# Patient Record
Sex: FEMALE | Race: BLACK OR AFRICAN AMERICAN | Employment: FULL TIME | URBAN - METROPOLITAN AREA
[De-identification: names, ages, dates, MRNs, and addresses within clinical notes are randomized per-mention and may not be internally consistent; named-entity substitution may affect disease eponyms.]

---

## 2017-05-17 ENCOUNTER — OFFICE VISIT (OUTPATIENT)
Dept: OBGYN CLINIC | Facility: CLINIC | Age: 52
End: 2017-05-17

## 2017-05-17 ENCOUNTER — APPOINTMENT (OUTPATIENT)
Dept: LAB | Facility: REFERENCE LAB | Age: 52
End: 2017-05-17
Attending: OBSTETRICS & GYNECOLOGY
Payer: COMMERCIAL

## 2017-05-17 VITALS
HEIGHT: 66 IN | WEIGHT: 273.19 LBS | SYSTOLIC BLOOD PRESSURE: 130 MMHG | DIASTOLIC BLOOD PRESSURE: 80 MMHG | BODY MASS INDEX: 43.91 KG/M2

## 2017-05-17 DIAGNOSIS — Z12.4 ROUTINE CERVICAL SMEAR: ICD-10-CM

## 2017-05-17 DIAGNOSIS — Z01.419 WOMEN'S ANNUAL ROUTINE GYNECOLOGICAL EXAMINATION: Primary | ICD-10-CM

## 2017-05-17 DIAGNOSIS — Z11.3 SCREENING EXAMINATION FOR VENEREAL DISEASE: ICD-10-CM

## 2017-05-17 DIAGNOSIS — Z30.431 ENCOUNTER FOR ROUTINE CHECKING OF INTRAUTERINE CONTRACEPTIVE DEVICE (IUD): ICD-10-CM

## 2017-05-17 PROCEDURE — 87389 HIV-1 AG W/HIV-1&-2 AB AG IA: CPT

## 2017-05-17 PROCEDURE — 87624 HPV HI-RISK TYP POOLED RSLT: CPT | Performed by: OBSTETRICS & GYNECOLOGY

## 2017-05-17 PROCEDURE — 88175 CYTOPATH C/V AUTO FLUID REDO: CPT | Performed by: OBSTETRICS & GYNECOLOGY

## 2017-05-17 PROCEDURE — 86780 TREPONEMA PALLIDUM: CPT

## 2017-05-17 PROCEDURE — 87491 CHLMYD TRACH DNA AMP PROBE: CPT | Performed by: OBSTETRICS & GYNECOLOGY

## 2017-05-17 PROCEDURE — 36415 COLL VENOUS BLD VENIPUNCTURE: CPT

## 2017-05-17 PROCEDURE — 87591 N.GONORRHOEAE DNA AMP PROB: CPT | Performed by: OBSTETRICS & GYNECOLOGY

## 2017-05-17 PROCEDURE — 87340 HEPATITIS B SURFACE AG IA: CPT

## 2017-05-17 PROCEDURE — 99386 PREV VISIT NEW AGE 40-64: CPT | Performed by: OBSTETRICS & GYNECOLOGY

## 2017-05-17 RX ORDER — ZOLPIDEM TARTRATE 5 MG/1
5 TABLET ORAL
Refills: 0 | COMMUNITY
Start: 2017-03-31 | End: 2018-05-21

## 2017-05-17 RX ORDER — GABAPENTIN 300 MG/1
CAPSULE ORAL
Refills: 5 | COMMUNITY
Start: 2017-04-28 | End: 2018-05-21

## 2017-05-17 RX ORDER — AMLODIPINE BESYLATE 10 MG/1
10 TABLET ORAL
Refills: 0 | COMMUNITY
Start: 2017-04-04 | End: 2018-05-21

## 2017-05-17 RX ORDER — TRAMADOL HYDROCHLORIDE 50 MG/1
TABLET ORAL
Refills: 2 | COMMUNITY
Start: 2017-04-28 | End: 2018-05-21

## 2017-05-17 NOTE — PROGRESS NOTES
GYN H&P     5/17/2017  11:08 AM    CC: Patient is here to establish care    HPI: Patient is a 54year old [id number] here for PAP and annual exam. Has mammogram scheduled. Cycles still regular with Paraguard IUD in place without problems since 2007.  No pelvi Cancer Paternal Aunt    • Colon Cancer Maternal Uncle        Social History   Marital Status: Single  Spouse Name: N/A    Years of Education: N/A  Number of Children: N/A     Occupational History  None on file     Social History Main Topics   Smoking statu intrauterine contraceptive device (IUD)  - Paraguard in good position    3. Screening examination for venereal disease  - TREP; Future  - Rubella, IGG; Future  - HIV AG AB Combo; Future  - *Venipuncture;  Future  - ThinPrep PAP Smear  - Hpv Dna  High Risk ,

## 2018-03-09 ENCOUNTER — TELEPHONE (OUTPATIENT)
Dept: OBGYN CLINIC | Facility: CLINIC | Age: 53
End: 2018-03-09

## 2018-03-09 NOTE — TELEPHONE ENCOUNTER
Lm on voicemail that she needs annual screening mammogram. Last mammogram reported in 2015.  Pt to call back update the chart with recent mammogram or schedule mammogram.

## 2018-05-21 ENCOUNTER — OFFICE VISIT (OUTPATIENT)
Dept: FAMILY MEDICINE CLINIC | Facility: CLINIC | Age: 53
End: 2018-05-21

## 2018-05-21 VITALS
WEIGHT: 293 LBS | HEIGHT: 65.5 IN | SYSTOLIC BLOOD PRESSURE: 126 MMHG | RESPIRATION RATE: 14 BRPM | TEMPERATURE: 98 F | BODY MASS INDEX: 48.23 KG/M2 | DIASTOLIC BLOOD PRESSURE: 84 MMHG | HEART RATE: 89 BPM

## 2018-05-21 DIAGNOSIS — M54.50 CHRONIC BILATERAL LOW BACK PAIN WITHOUT SCIATICA: ICD-10-CM

## 2018-05-21 DIAGNOSIS — G89.29 CHRONIC BILATERAL LOW BACK PAIN WITHOUT SCIATICA: ICD-10-CM

## 2018-05-21 DIAGNOSIS — I10 ESSENTIAL HYPERTENSION: Primary | ICD-10-CM

## 2018-05-21 DIAGNOSIS — G47.00 INSOMNIA, UNSPECIFIED TYPE: ICD-10-CM

## 2018-05-21 PROCEDURE — 99203 OFFICE O/P NEW LOW 30 MIN: CPT | Performed by: FAMILY MEDICINE

## 2018-05-21 PROCEDURE — 99212 OFFICE O/P EST SF 10 MIN: CPT | Performed by: FAMILY MEDICINE

## 2018-05-21 RX ORDER — IBUPROFEN 800 MG/1
800 TABLET ORAL
Qty: 90 TABLET | Refills: 1 | Status: SHIPPED | OUTPATIENT
Start: 2018-05-21 | End: 2019-07-31

## 2018-05-21 RX ORDER — IBUPROFEN 800 MG/1
1 TABLET ORAL
Refills: 5 | COMMUNITY
Start: 2018-04-28 | End: 2018-05-21

## 2018-05-21 RX ORDER — TRAMADOL HYDROCHLORIDE 50 MG/1
TABLET ORAL
Qty: 60 TABLET | Refills: 0 | Status: SHIPPED | OUTPATIENT
Start: 2018-05-21 | End: 2019-07-31

## 2018-05-21 RX ORDER — AMLODIPINE BESYLATE 10 MG/1
10 TABLET ORAL
Qty: 90 TABLET | Refills: 0 | Status: SHIPPED | OUTPATIENT
Start: 2018-05-21 | End: 2018-07-24

## 2018-05-21 RX ORDER — ZOLPIDEM TARTRATE 5 MG/1
5 TABLET ORAL NIGHTLY
Qty: 30 TABLET | Refills: 1 | Status: SHIPPED | OUTPATIENT
Start: 2018-05-21 | End: 2018-08-22

## 2018-05-21 NOTE — PROGRESS NOTES
HPI:    Enma Price is a 50year old female presents to clinic as a new patient to establish care. Has HTN. Notes compliance with meds, denies side effects.  Patient denies HAs, blurry vision, nausea, vomiting, CP, palpitations, dizziness, SOB, or ot tablet (10 mg total) by mouth once daily. Disp: 90 tablet Rfl: 0       Allergies:    Shellfish-Derived P*    ANAPHYLAXIS  Ace Inhibitors          SWELLING    Comment:Swelling of the lips      ROS:   Review of Systems   Constitutional: Negative.     HENT: Ne prescribe her pain medication.     (G47.00) Insomnia, unspecified type  - Ambien refilled. Not to take this more than twice a week.  Patient advised not to drive a vehicle, drink alcohol, operate heavy/sharp machinery, or go to work while taking this medica

## 2018-07-24 RX ORDER — AMLODIPINE BESYLATE 10 MG/1
TABLET ORAL
Qty: 90 TABLET | Refills: 0 | Status: SHIPPED | OUTPATIENT
Start: 2018-07-24 | End: 2019-07-31

## 2018-08-23 RX ORDER — ZOLPIDEM TARTRATE 5 MG/1
TABLET ORAL
Qty: 30 TABLET | Refills: 1 | Status: SHIPPED | OUTPATIENT
Start: 2018-08-23 | End: 2018-12-20

## 2018-08-23 RX ORDER — ZOLPIDEM TARTRATE 5 MG/1
TABLET ORAL
Qty: 30 TABLET | Refills: 1 | OUTPATIENT
Start: 2018-08-23 | End: 2018-08-23

## 2018-08-23 NOTE — TELEPHONE ENCOUNTER
No Protocol on this med.        Pending Prescriptions Disp Refills    ZOLPIDEM TARTRATE 5 MG Oral Tab [Pharmacy Med Name: ZOLPIDEM TARTRATE 5 MG TABLET] 30 tablet 1     Sig: TAKE 1 TABLET BY MOUTH EVERY DAY AT BEDTIME           LOV 5-21-18   [id number] # 30 +

## 2018-09-17 ENCOUNTER — HOSPITAL ENCOUNTER (OUTPATIENT)
Age: 53
Discharge: HOME | End: 2018-09-17
Attending: EMERGENCY MEDICINE
Payer: COMMERCIAL

## 2018-09-17 VITALS
OXYGEN SATURATION: 98 % | DIASTOLIC BLOOD PRESSURE: 79 MMHG | SYSTOLIC BLOOD PRESSURE: 121 MMHG | HEART RATE: 88 BPM | RESPIRATION RATE: 20 BRPM | TEMPERATURE: 98 F

## 2018-09-17 DIAGNOSIS — J02.0 ACUTE STREPTOCOCCAL PHARYNGITIS: Primary | ICD-10-CM

## 2018-09-17 LAB — S PYO AG THROAT QL: POSITIVE

## 2018-09-17 PROCEDURE — 87430 STREP A AG IA: CPT

## 2018-09-17 PROCEDURE — 99213 OFFICE O/P EST LOW 20 MIN: CPT

## 2018-09-17 PROCEDURE — 99204 OFFICE O/P NEW MOD 45 MIN: CPT

## 2018-09-17 RX ORDER — PENICILLIN V POTASSIUM 500 MG/1
500 TABLET ORAL 3 TIMES DAILY
Qty: 30 TABLET | Refills: 0 | Status: SHIPPED | OUTPATIENT
Start: 2018-09-17 | End: 2018-09-27

## 2018-09-17 NOTE — ED INITIAL ASSESSMENT (HPI)
Pt here with complaints of sore throat, cough and congestion that began on Saturday, pt states it hurts to swallow , pt denies any fevers

## 2018-09-17 NOTE — ED PROVIDER NOTES
Patient Seen in: [address]    History   Patient presents with:  Sore Throat    Stated Complaint: sore throat    HPI    The patient's a 51-year-old female with complaints of a sore throat for the last 3 days associated w discharge  Pharynx: Erythema without exudate, tonsillar hypertrophy noted, uvula midline, no drooling trismus or stridor  Neck: Supple without palpable adenopathy  Skin: No rash    ED Course     Labs Reviewed   [facility] POCT RAPID STREP - Abnormal; Notable for

## 2018-09-18 ENCOUNTER — TELEPHONE (OUTPATIENT)
Dept: OBGYN CLINIC | Facility: CLINIC | Age: 53
End: 2018-09-18

## 2018-09-18 DIAGNOSIS — Z12.39 SCREENING BREAST EXAMINATION: Primary | ICD-10-CM

## 2018-09-18 NOTE — TELEPHONE ENCOUNTER
#1 [facility] to schedule annual mammogram   #2 [address] [facility] to schedule annual mammogram, orders done in epic and mailed copy to pt.

## 2018-12-22 RX ORDER — ZOLPIDEM TARTRATE 5 MG/1
TABLET ORAL
Qty: 30 TABLET | Refills: 1 | Status: SHIPPED | OUTPATIENT
Start: 2018-12-22 | End: 2019-07-31

## 2018-12-22 NOTE — TELEPHONE ENCOUNTER
Requested Prescriptions     Pending Prescriptions Disp Refills   • ZOLPIDEM TARTRATE 5 MG Oral Tab [Pharmacy Med Name: ZOLPIDEM TARTRATE 5 MG TABLET] 30 tablet 1     Sig: TAKE 1 TABLET BY MOUTH AT BEDTIME       Last Office Visit with PCP: 5/21/2018    Radha Layne

## 2019-07-31 ENCOUNTER — OFFICE VISIT (OUTPATIENT)
Dept: INTERNAL MEDICINE CLINIC | Facility: CLINIC | Age: 54
End: 2019-07-31
Payer: COMMERCIAL

## 2019-07-31 ENCOUNTER — LAB ENCOUNTER (OUTPATIENT)
Dept: LAB | Age: 54
End: 2019-07-31
Attending: INTERNAL MEDICINE
Payer: COMMERCIAL

## 2019-07-31 VITALS
SYSTOLIC BLOOD PRESSURE: 130 MMHG | DIASTOLIC BLOOD PRESSURE: 82 MMHG | BODY MASS INDEX: 45.92 KG/M2 | HEIGHT: 65.5 IN | TEMPERATURE: 98 F | WEIGHT: 279 LBS | HEART RATE: 89 BPM | OXYGEN SATURATION: 98 %

## 2019-07-31 DIAGNOSIS — I10 BENIGN ESSENTIAL HTN: ICD-10-CM

## 2019-07-31 DIAGNOSIS — M54.50 CHRONIC LOW BACK PAIN, UNSPECIFIED BACK PAIN LATERALITY, UNSPECIFIED WHETHER SCIATICA PRESENT: ICD-10-CM

## 2019-07-31 DIAGNOSIS — Z97.5 IUD (INTRAUTERINE DEVICE) IN PLACE: ICD-10-CM

## 2019-07-31 DIAGNOSIS — Z00.00 PHYSICAL EXAM: Primary | ICD-10-CM

## 2019-07-31 DIAGNOSIS — G89.29 CHRONIC LOW BACK PAIN, UNSPECIFIED BACK PAIN LATERALITY, UNSPECIFIED WHETHER SCIATICA PRESENT: ICD-10-CM

## 2019-07-31 DIAGNOSIS — Z00.00 PHYSICAL EXAM: ICD-10-CM

## 2019-07-31 DIAGNOSIS — N76.1 SUBACUTE VAGINITIS: ICD-10-CM

## 2019-07-31 DIAGNOSIS — E66.01 CLASS 3 SEVERE OBESITY WITH BODY MASS INDEX (BMI) OF 40.0 TO 44.9 IN ADULT, UNSPECIFIED OBESITY TYPE, UNSPECIFIED WHETHER SERIOUS COMORBIDITY PRESENT (HCC): ICD-10-CM

## 2019-07-31 DIAGNOSIS — Z12.9 CANCER SCREENING: ICD-10-CM

## 2019-07-31 LAB
ALBUMIN SERPL-MCNC: 4.1 G/DL (ref 3.4–5)
ALBUMIN/GLOB SERPL: 0.9 {RATIO} (ref 1–2)
ALP LIVER SERPL-CCNC: 73 U/L (ref 41–108)
ALT SERPL-CCNC: 27 U/L (ref 13–56)
ANION GAP SERPL CALC-SCNC: 5 MMOL/L (ref 0–18)
AST SERPL-CCNC: 19 U/L (ref 15–37)
BASOPHILS # BLD AUTO: 0.13 X10(3) UL (ref 0–0.2)
BASOPHILS NFR BLD AUTO: 2.9 %
BILIRUB SERPL-MCNC: 0.2 MG/DL (ref 0.1–2)
BUN BLD-MCNC: 13 MG/DL (ref 7–18)
BUN/CREAT SERPL: 11.7 (ref 10–20)
CALCIUM BLD-MCNC: 9.7 MG/DL (ref 8.5–10.1)
CHLORIDE SERPL-SCNC: 105 MMOL/L (ref 98–112)
CHOLEST SMN-MCNC: 159 MG/DL (ref ?–200)
CO2 SERPL-SCNC: 30 MMOL/L (ref 21–32)
CREAT BLD-MCNC: 1.11 MG/DL (ref 0.55–1.02)
DEPRECATED RDW RBC AUTO: 40.5 FL (ref 35.1–46.3)
EOSINOPHIL # BLD AUTO: 0.11 X10(3) UL (ref 0–0.7)
EOSINOPHIL NFR BLD AUTO: 2.5 %
ERYTHROCYTE [DISTWIDTH] IN BLOOD BY AUTOMATED COUNT: 15.3 % (ref 11–15)
EST. AVERAGE GLUCOSE BLD GHB EST-MCNC: 120 MG/DL (ref 68–126)
GLOBULIN PLAS-MCNC: 4.6 G/DL (ref 2.8–4.4)
GLUCOSE BLD-MCNC: 92 MG/DL (ref 70–99)
HBA1C MFR BLD HPLC: 5.8 % (ref ?–5.7)
HCT VFR BLD AUTO: 35 % (ref 35–48)
HDLC SERPL-MCNC: 71 MG/DL (ref 40–59)
HGB BLD-MCNC: 11.3 G/DL (ref 12–16)
IMM GRANULOCYTES # BLD AUTO: 0.01 X10(3) UL (ref 0–1)
IMM GRANULOCYTES NFR BLD: 0.2 %
LDLC SERPL CALC-MCNC: 75 MG/DL (ref ?–100)
LYMPHOCYTES # BLD AUTO: 1.9 X10(3) UL (ref 1–4)
LYMPHOCYTES NFR BLD AUTO: 42.8 %
M PROTEIN MFR SERPL ELPH: 8.7 G/DL (ref 6.4–8.2)
MCH RBC QN AUTO: 23.6 PG (ref 26–34)
MCHC RBC AUTO-ENTMCNC: 32.3 G/DL (ref 31–37)
MCV RBC AUTO: 73.2 FL (ref 80–100)
MONOCYTES # BLD AUTO: 0.31 X10(3) UL (ref 0.1–1)
MONOCYTES NFR BLD AUTO: 7 %
NEUTROPHILS # BLD AUTO: 1.98 X10 (3) UL (ref 1.5–7.7)
NEUTROPHILS # BLD AUTO: 1.98 X10(3) UL (ref 1.5–7.7)
NEUTROPHILS NFR BLD AUTO: 44.6 %
NONHDLC SERPL-MCNC: 88 MG/DL (ref ?–130)
OSMOLALITY SERPL CALC.SUM OF ELEC: 290 MOSM/KG (ref 275–295)
PATIENT FASTING: NO
PATIENT FASTING: NO
PLATELET # BLD AUTO: 439 10(3)UL (ref 150–450)
POTASSIUM SERPL-SCNC: 4 MMOL/L (ref 3.5–5.1)
RBC # BLD AUTO: 4.78 X10(6)UL (ref 3.8–5.3)
SODIUM SERPL-SCNC: 140 MMOL/L (ref 136–145)
TRIGL SERPL-MCNC: 65 MG/DL (ref 30–149)
TSI SER-ACNC: 1.05 MIU/ML (ref 0.36–3.74)
VLDLC SERPL CALC-MCNC: 13 MG/DL (ref 0–30)
WBC # BLD AUTO: 4.4 X10(3) UL (ref 4–11)

## 2019-07-31 PROCEDURE — 84443 ASSAY THYROID STIM HORMONE: CPT

## 2019-07-31 PROCEDURE — 83036 HEMOGLOBIN GLYCOSYLATED A1C: CPT

## 2019-07-31 PROCEDURE — 87389 HIV-1 AG W/HIV-1&-2 AB AG IA: CPT

## 2019-07-31 PROCEDURE — 85025 COMPLETE CBC W/AUTO DIFF WBC: CPT

## 2019-07-31 PROCEDURE — 87491 CHLMYD TRACH DNA AMP PROBE: CPT

## 2019-07-31 PROCEDURE — 36415 COLL VENOUS BLD VENIPUNCTURE: CPT

## 2019-07-31 PROCEDURE — 80061 LIPID PANEL: CPT

## 2019-07-31 PROCEDURE — 80053 COMPREHEN METABOLIC PANEL: CPT

## 2019-07-31 PROCEDURE — 86780 TREPONEMA PALLIDUM: CPT

## 2019-07-31 PROCEDURE — 99386 PREV VISIT NEW AGE 40-64: CPT | Performed by: INTERNAL MEDICINE

## 2019-07-31 PROCEDURE — 87591 N.GONORRHOEAE DNA AMP PROB: CPT

## 2019-07-31 RX ORDER — ZOLPIDEM TARTRATE 5 MG/1
TABLET ORAL
Qty: 30 TABLET | Refills: 1 | Status: SHIPPED | OUTPATIENT
Start: 2019-07-31 | End: 2019-10-10

## 2019-07-31 RX ORDER — NAPROXEN SODIUM 220 MG
1 TABLET ORAL 2 TIMES DAILY PRN
COMMUNITY

## 2019-07-31 RX ORDER — AMLODIPINE BESYLATE 10 MG/1
10 TABLET ORAL
Qty: 90 TABLET | Refills: 3 | Status: SHIPPED | OUTPATIENT
Start: 2019-07-31 | End: 2020-01-06

## 2019-07-31 RX ORDER — TRAMADOL HYDROCHLORIDE 50 MG/1
TABLET ORAL
Qty: 60 TABLET | Refills: 1 | Status: SHIPPED | OUTPATIENT
Start: 2019-07-31 | End: 2019-10-15

## 2019-07-31 RX ORDER — FLUCONAZOLE 150 MG/1
150 TABLET ORAL ONCE
Qty: 1 TABLET | Refills: 0 | Status: SHIPPED | OUTPATIENT
Start: 2019-07-31 | End: 2019-07-31

## 2019-07-31 NOTE — PROGRESS NOTES
HPI:    Patient ID: Adrian Dumont is a 48year old female. Presents to office for the first time to establish care. Patient has long-standing history of hypertension controlled with current medication.     She also has struggled with weight and has h Tobacco Use      Smoking status: Never Smoker      Smokeless tobacco: Never Used    Substance and Sexual Activity      Alcohol use:  Yes        Alcohol/week: 0.0 standard drinks        Comment: 2-3 drinks/month      Drug use: No      Sexual activity: Never ANAPHYLAXIS  Ace Inhibitors          SWELLING    Comment:Swelling of the lips   PHYSICAL EXAM:   /82 (BP Location: Right arm, Patient Position: Sitting, Cuff Size: adult)   Pulse 89   Temp 98.2 °F (36.8 °C) (Oral)   Ht 5' 5.5\" (1.664 m)   Wt 279 lb diagnosis)  Benign essential htn  Chronic low back pain, unspecified back pain laterality, unspecified whether sciatica present  Class 3 severe obesity with body mass index (bmi) of 40.0 to 44.9 in adult, unspecified obesity type, unspecified whether jamilah available. We will arrange follow-up after lab results reviewed.   Orders Placed This Encounter      Hpv Dna  High Risk , Thin Prep Collect      Comp Metabolic Panel (14) [E]      Lipid Panel [E]      CBC W Differential W Platelet [E]      Hemoglobin A1C

## 2019-08-01 LAB
C TRACH DNA SPEC QL NAA+PROBE: NEGATIVE
HPV I/H RISK 1 DNA SPEC QL NAA+PROBE: NEGATIVE
N GONORRHOEA DNA SPEC QL NAA+PROBE: NEGATIVE

## 2019-08-02 LAB — T PALLIDUM AB SER QL: NEGATIVE

## 2019-08-05 ENCOUNTER — TELEPHONE (OUTPATIENT)
Dept: INTERNAL MEDICINE CLINIC | Facility: CLINIC | Age: 54
End: 2019-08-05

## 2019-08-05 NOTE — TELEPHONE ENCOUNTER
----- Message from Juliet Nicholas MD sent at 8/5/2019  3:36 PM CDT -----  Pap smear is normal with negative HPV. GC and Chlamydia, syphilis test, and HIV all negative.   Cholesterol is 159 with LDL of 75--this is very good and at goal LDL of less than 13

## 2019-10-10 NOTE — TELEPHONE ENCOUNTER
To MD:  The above refill request is for a controlled substance. Please review pended medication order. Print and sign for staff to fax to pharmacy or prescribe electronically.     [place]  - 9/3/2019 #30    Last refilled -  7/31/2019 #30/1

## 2019-10-11 RX ORDER — ZOLPIDEM TARTRATE 5 MG/1
TABLET ORAL
Qty: 30 TABLET | Refills: 1 | Status: SHIPPED | OUTPATIENT
Start: 2019-10-11 | End: 2019-12-30

## 2019-10-15 RX ORDER — TRAMADOL HYDROCHLORIDE 50 MG/1
TABLET ORAL
Qty: 60 TABLET | Refills: 1 | Status: SHIPPED
Start: 2019-10-15 | End: 2019-12-30

## 2019-10-15 NOTE — TELEPHONE ENCOUNTER
Patient calling checking on status of refill request.  Needs Tramadol and Zolpridem Tartrate refilled. She is completely out of both medications.     Best number to contact patient at  [phone]

## 2019-10-15 NOTE — TELEPHONE ENCOUNTER
To Dr. Blaine Nixon---    The above refill request is for a controlled substance. Please review pended medication order. Print and sign for staff to fax to pharmacy.

## 2019-11-05 ENCOUNTER — HOSPITAL ENCOUNTER (OUTPATIENT)
Dept: MAMMOGRAPHY | Age: 54
Discharge: HOME | End: 2019-11-05
Attending: INTERNAL MEDICINE
Payer: COMMERCIAL

## 2019-11-05 DIAGNOSIS — Z12.9 CANCER SCREENING: ICD-10-CM

## 2019-11-05 PROCEDURE — 77063 BREAST TOMOSYNTHESIS BI: CPT | Performed by: INTERNAL MEDICINE

## 2019-11-05 PROCEDURE — 77067 SCR MAMMO BI INCL CAD: CPT | Performed by: INTERNAL MEDICINE

## 2019-11-06 ENCOUNTER — TELEPHONE (OUTPATIENT)
Dept: INTERNAL MEDICINE CLINIC | Facility: CLINIC | Age: 54
End: 2019-11-06

## 2019-11-06 NOTE — TELEPHONE ENCOUNTER
----- Message from Claudine Leventhal, MD sent at 11/6/2019  8:53 AM CST -----  Screening mammogram is normal.  Patient will need follow-up mammogram in 1 year.

## 2019-12-26 NOTE — TELEPHONE ENCOUNTER
To FD to schedule follow up OV (new pt visit 7/2019) then to MD for refills    To DR. GODOY    Tramadol filled 11/16 and 12/6  Zolpidem filled 11/16

## 2019-12-30 RX ORDER — ZOLPIDEM TARTRATE 5 MG/1
TABLET ORAL
Qty: 30 TABLET | Refills: 1 | Status: SHIPPED
Start: 2019-12-30 | End: 2020-01-02

## 2019-12-30 RX ORDER — TRAMADOL HYDROCHLORIDE 50 MG/1
TABLET ORAL
Qty: 60 TABLET | Refills: 1 | Status: SHIPPED | OUTPATIENT
Start: 2019-12-30 | End: 2020-01-02

## 2020-01-02 NOTE — TELEPHONE ENCOUNTER
[facility] pharmacy calling. Patient insurance has changed to [address]. Asking that Tramadol and Zolpidem refills be sent to [place].     [facility]

## 2020-01-02 NOTE — TELEPHONE ENCOUNTER
To Dr Gabriele You, see message below. Meds pended again for you with [facility] pharmacy.

## 2020-01-03 RX ORDER — TRAMADOL HYDROCHLORIDE 50 MG/1
TABLET ORAL
Qty: 60 TABLET | Refills: 1 | Status: SHIPPED | OUTPATIENT
Start: 2020-01-03 | End: 2020-01-06

## 2020-01-03 RX ORDER — ZOLPIDEM TARTRATE 5 MG/1
TABLET ORAL
Qty: 30 TABLET | Refills: 1 | Status: SHIPPED | OUTPATIENT
Start: 2020-01-03 | End: 2020-01-06

## 2020-01-06 ENCOUNTER — OFFICE VISIT (OUTPATIENT)
Dept: INTERNAL MEDICINE CLINIC | Facility: CLINIC | Age: 55
End: 2020-01-06
Payer: COMMERCIAL

## 2020-01-06 ENCOUNTER — TELEPHONE (OUTPATIENT)
Dept: INTERNAL MEDICINE CLINIC | Facility: CLINIC | Age: 55
End: 2020-01-06

## 2020-01-06 VITALS
HEART RATE: 105 BPM | BODY MASS INDEX: 44.67 KG/M2 | WEIGHT: 271.38 LBS | SYSTOLIC BLOOD PRESSURE: 124 MMHG | OXYGEN SATURATION: 100 % | TEMPERATURE: 98 F | DIASTOLIC BLOOD PRESSURE: 78 MMHG | HEIGHT: 65.5 IN

## 2020-01-06 DIAGNOSIS — M19.90 OSTEOARTHRITIS, UNSPECIFIED OSTEOARTHRITIS TYPE, UNSPECIFIED SITE: ICD-10-CM

## 2020-01-06 DIAGNOSIS — E66.01 CLASS 3 SEVERE OBESITY WITH BODY MASS INDEX (BMI) OF 40.0 TO 44.9 IN ADULT, UNSPECIFIED OBESITY TYPE, UNSPECIFIED WHETHER SERIOUS COMORBIDITY PRESENT (HCC): ICD-10-CM

## 2020-01-06 DIAGNOSIS — I10 ESSENTIAL HYPERTENSION: Primary | ICD-10-CM

## 2020-01-06 LAB — INDURATION (): 0 MM (ref 0–11)

## 2020-01-06 PROCEDURE — 90686 IIV4 VACC NO PRSV 0.5 ML IM: CPT | Performed by: INTERNAL MEDICINE

## 2020-01-06 PROCEDURE — 99213 OFFICE O/P EST LOW 20 MIN: CPT | Performed by: INTERNAL MEDICINE

## 2020-01-06 PROCEDURE — 90471 IMMUNIZATION ADMIN: CPT | Performed by: INTERNAL MEDICINE

## 2020-01-06 PROCEDURE — 86580 TB INTRADERMAL TEST: CPT | Performed by: INTERNAL MEDICINE

## 2020-01-06 RX ORDER — ZOLPIDEM TARTRATE 5 MG/1
TABLET ORAL
Qty: 30 TABLET | Refills: 1 | Status: SHIPPED | OUTPATIENT
Start: 2020-01-06 | End: 2020-05-11

## 2020-01-06 RX ORDER — TRAMADOL HYDROCHLORIDE 50 MG/1
TABLET ORAL
Qty: 60 TABLET | Refills: 1 | Status: SHIPPED | OUTPATIENT
Start: 2020-01-06 | End: 2020-06-04

## 2020-01-06 RX ORDER — AMLODIPINE BESYLATE 10 MG/1
10 TABLET ORAL
Qty: 90 TABLET | Refills: 3 | Status: SHIPPED | OUTPATIENT
Start: 2020-01-06 | End: 2021-03-08

## 2020-01-06 NOTE — PROGRESS NOTES
HPI:    Patient ID: Blaire St is a 48year old female. Presents for follow-up. Overall has been feeling well. Has lost 7 pounds since last appointment by trying to follow a proper dietary habits.   Compliant with medications and tolerating all her ASSESSMENT/PLAN:   Essential hypertension  (primary encounter diagnosis)  Osteoarthritis, unspecified osteoarthritis type, unspecified site  Class 3 severe obesity with body mass index (bmi) of 40.0 to 44.9 in adult, unspecified obesity type,

## 2020-01-06 NOTE — TELEPHONE ENCOUNTER
Per Dr. Jozef Hawkins; OK for pt to read TB results 1/8 from home. Clinical Staff to follow up and document appropriately.  Pt has had previous TB Skin Tests in the past for work and is aware skin test reaction should be read between 48 and 72 hours after administr

## 2020-02-05 NOTE — TELEPHONE ENCOUNTER
New order entered for TB test- called patient and relayed message to call office to schedule RN visit - left on VM per hipaa

## 2020-02-05 NOTE — TELEPHONE ENCOUNTER
To Dr. Bertram Garza----    Patient has been attempted to be reached x3 with no call back. Do you want letter mailed to home or continue to attempt to reach patient via phone. If letter: please specify what to tell patient.      If call, do you want RN to place new

## 2020-02-05 NOTE — TELEPHONE ENCOUNTER
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 GODOY

## 2020-03-19 ENCOUNTER — TELEPHONE (OUTPATIENT)
Dept: INTERNAL MEDICINE CLINIC | Facility: CLINIC | Age: 55
End: 2020-03-19

## 2020-03-19 RX ORDER — AMOXICILLIN 500 MG/1
500 CAPSULE ORAL 3 TIMES DAILY
Qty: 30 CAPSULE | Refills: 0 | Status: SHIPPED | OUTPATIENT
Start: 2020-03-19 | End: 2020-03-29

## 2020-03-19 NOTE — TELEPHONE ENCOUNTER
To Dr. Brina Bolanos - see below - sx confirmed as below - sx onset: 2 days ago. Denies fever/chills/cough/difficulty breathing/travel/no contact with any known carrier of flu.

## 2020-03-19 NOTE — TELEPHONE ENCOUNTER
Would empirically treat with amoxicillin 500 mg p.o. 3 times daily x10 days for treatment of pharyngitis. Patient may use warm salt water gargles, Tylenol as needed.   Call if no symptomatic improvement over the next 7 to 10 days, sooner if worse

## 2020-05-11 RX ORDER — ZOLPIDEM TARTRATE 5 MG/1
TABLET ORAL
Qty: 30 TABLET | Refills: 1 | Status: SHIPPED | OUTPATIENT
Start: 2020-05-11 | End: 2020-06-04

## 2020-05-11 NOTE — TELEPHONE ENCOUNTER
To Dr. Emigdio Sung---    The above refill request is for a controlled substance. Please review pended medication order.

## 2020-05-27 RX ORDER — AMLODIPINE BESYLATE 10 MG/1
10 TABLET ORAL
Qty: 90 TABLET | Refills: 3 | OUTPATIENT
Start: 2020-05-27

## 2020-05-28 RX ORDER — TRAMADOL HYDROCHLORIDE 50 MG/1
TABLET ORAL
Qty: 60 TABLET | Refills: 1 | OUTPATIENT
Start: 2020-05-28

## 2020-05-28 RX ORDER — ZOLPIDEM TARTRATE 5 MG/1
TABLET ORAL
Qty: 30 TABLET | Refills: 1 | OUTPATIENT
Start: 2020-05-28

## 2020-05-28 NOTE — TELEPHONE ENCOUNTER
Patient last refilled zolpidem and May 11 and therefore is not due until June 11    It would be okay to fill tramadol

## 2020-06-03 ENCOUNTER — TELEPHONE (OUTPATIENT)
Dept: INTERNAL MEDICINE CLINIC | Facility: CLINIC | Age: 55
End: 2020-06-03

## 2020-06-04 RX ORDER — TRAMADOL HYDROCHLORIDE 50 MG/1
TABLET ORAL
Qty: 60 TABLET | Refills: 1 | Status: SHIPPED | OUTPATIENT
Start: 2020-06-04 | End: 2020-08-24

## 2020-06-04 RX ORDER — ZOLPIDEM TARTRATE 5 MG/1
TABLET ORAL
Qty: 30 TABLET | Refills: 1 | Status: SHIPPED | OUTPATIENT
Start: 2020-06-04 | End: 2020-08-09

## 2020-06-04 NOTE — TELEPHONE ENCOUNTER
To MD:  The above refill request is for a controlled substance. Please review pended medication order. Print and sign for staff to fax to pharmacy or prescribe electronically.       Zolpidem    [place]  - 5/11/2020 #30     Last refilled - 5/11/2020 #30/1

## 2020-08-07 ENCOUNTER — TELEPHONE (OUTPATIENT)
Dept: INTERNAL MEDICINE CLINIC | Facility: CLINIC | Age: 55
End: 2020-08-07

## 2020-08-09 RX ORDER — ZOLPIDEM TARTRATE 5 MG/1
TABLET ORAL
Qty: 30 TABLET | Refills: 1 | Status: SHIPPED | OUTPATIENT
Start: 2020-08-09 | End: 2020-11-04

## 2020-08-24 ENCOUNTER — TELEPHONE (OUTPATIENT)
Dept: INTERNAL MEDICINE CLINIC | Facility: CLINIC | Age: 55
End: 2020-08-24

## 2020-08-24 RX ORDER — TRAMADOL HYDROCHLORIDE 50 MG/1
TABLET ORAL
Qty: 60 TABLET | Refills: 1 | Status: SHIPPED | OUTPATIENT
Start: 2020-08-24 | End: 2020-11-04

## 2020-09-18 RX ORDER — ZOLPIDEM TARTRATE 5 MG/1
TABLET ORAL
Qty: 30 TABLET | Refills: 0 | OUTPATIENT
Start: 2020-09-18

## 2020-09-18 NOTE — TELEPHONE ENCOUNTER
Current refill request refused due to refill is either a duplicate request or has active refills at the pharmacy. Check previous templates.     Requested Prescriptions     Refused Prescriptions Disp Refills   • ZOLPIDEM 5 MG Oral Tab [Pharmacy Med Name: Edson Branch

## 2020-11-04 ENCOUNTER — TELEPHONE (OUTPATIENT)
Dept: INTERNAL MEDICINE CLINIC | Facility: CLINIC | Age: 55
End: 2020-11-04

## 2020-11-04 RX ORDER — ZOLPIDEM TARTRATE 5 MG/1
TABLET ORAL
Qty: 30 TABLET | Refills: 1 | Status: SHIPPED | OUTPATIENT
Start: 2020-11-04 | End: 2021-01-13

## 2020-11-04 RX ORDER — TRAMADOL HYDROCHLORIDE 50 MG/1
TABLET ORAL
Qty: 60 TABLET | Refills: 1 | Status: SHIPPED | OUTPATIENT
Start: 2020-11-04 | End: 2021-01-13

## 2020-11-04 NOTE — TELEPHONE ENCOUNTER
To MD:  The above refill request is for a controlled substance. Please review pended medication order. Print and sign for staff to fax to pharmacy or prescribe electronically.     Tramadol: Last written 8/24/20 #60 with 1. Zolpidem last written 8/9/20 #3

## 2021-01-12 ENCOUNTER — TELEPHONE (OUTPATIENT)
Dept: INTERNAL MEDICINE CLINIC | Facility: CLINIC | Age: 56
End: 2021-01-12

## 2021-01-13 RX ORDER — TRAMADOL HYDROCHLORIDE 50 MG/1
50 TABLET ORAL EVERY 12 HOURS PRN
Qty: 60 TABLET | Refills: 1 | Status: SHIPPED | OUTPATIENT
Start: 2021-01-13 | End: 2021-05-11

## 2021-01-13 RX ORDER — ZOLPIDEM TARTRATE 5 MG/1
TABLET ORAL
Qty: 30 TABLET | Refills: 1 | Status: SHIPPED | OUTPATIENT
Start: 2021-01-13 | End: 2021-03-23

## 2021-01-14 NOTE — TELEPHONE ENCOUNTER
Tito SANDOVAL  Tramadol HCI 50 mg tab  ID [id number], no phone # on form    Placed in purple folder

## 2021-01-15 NOTE — TELEPHONE ENCOUNTER
Prior authorization request form received from [address] [place] [address]  for   Tramadol 50 mg    Placed in purple folder

## 2021-01-15 NOTE — TELEPHONE ENCOUNTER
Pt does not want to go with [facility], her cost with insurance is cheaper  Pt called [facility] and cost is $3 with insurance   Tasked to [employer]

## 2021-01-18 NOTE — TELEPHONE ENCOUNTER
Approval received for     Tramadol HCI tablet 50 mg - Generic    Quantity up to 60 tablets per 30 days    Placed in red folder

## 2021-02-25 NOTE — TELEPHONE ENCOUNTER
Patient last saw  1/6/2020. Needs appointment for annual PE.  To EMA  to please call patient and assist with scheduling then back to Rx group for refill

## 2021-03-08 RX ORDER — AMLODIPINE BESYLATE 10 MG/1
10 TABLET ORAL
Qty: 90 TABLET | Refills: 0 | Status: SHIPPED | OUTPATIENT
Start: 2021-03-08 | End: 2021-05-19

## 2021-03-08 NOTE — TELEPHONE ENCOUNTER
Noted, refill sent. Requested Prescriptions     Signed Prescriptions Disp Refills   • amLODIPine Besylate 10 MG Oral Tab 90 tablet 0     Sig: Take 1 tablet (10 mg total) by mouth once daily.      Authorizing Provider: Margaret Patel     Ordering User:

## 2021-03-08 NOTE — TELEPHONE ENCOUNTER
I attempted to contact the patient. No answer. I left a vmm for her to return my call. Pt scheduled physical w/ Dr Muna Willis on March 17  She is out of medication & needs a refill prior to appt

## 2021-03-23 RX ORDER — ZOLPIDEM TARTRATE 5 MG/1
5 TABLET ORAL NIGHTLY
Qty: 30 TABLET | Refills: 1 | Status: SHIPPED | OUTPATIENT
Start: 2021-03-23 | End: 2021-05-19

## 2021-03-23 NOTE — TELEPHONE ENCOUNTER
To Dr. Ev Acharya - see pended Rx and labs. Pt does have appt with you: 3/24/21. Last labs: 7/31/19    To MD:  The above refill request is for a controlled substance. Please review pended medication order.    Print and sign for staff to fax to pharmacy or pre

## 2021-04-24 ENCOUNTER — TELEPHONE (OUTPATIENT)
Dept: INTERNAL MEDICINE CLINIC | Facility: CLINIC | Age: 56
End: 2021-04-24

## 2021-04-27 RX ORDER — TRAMADOL HYDROCHLORIDE 50 MG/1
50 TABLET ORAL EVERY 12 HOURS PRN
Qty: 30 TABLET | Refills: 0 | OUTPATIENT
Start: 2021-04-27

## 2021-04-27 NOTE — TELEPHONE ENCOUNTER
Patient has not seen me since January 2020--needs physical exam and labs at that time  Therefore cannot refill meds at this time

## 2021-04-27 NOTE — TELEPHONE ENCOUNTER
Noted. Physical scheduled for May. Refill denied. Current refill request refused due to refill is either a duplicate request or has active refills at the pharmacy. Check previous templates.     Requested Prescriptions     Refused Prescriptions Disp Ref

## 2021-04-27 NOTE — TELEPHONE ENCOUNTER
To MD:  The above refill request is for a controlled substance. Please review pended medication order. Print and sign for staff to fax to pharmacy or prescribe electronically.     Last office visit: 1/6/20  Last time refill sent and quantity/refills: 1/1

## 2021-05-11 RX ORDER — TRAMADOL HYDROCHLORIDE 50 MG/1
50 TABLET ORAL EVERY 12 HOURS PRN
Qty: 16 TABLET | Refills: 0 | Status: SHIPPED | OUTPATIENT
Start: 2021-05-11 | End: 2021-05-19

## 2021-05-11 NOTE — TELEPHONE ENCOUNTER
Patient calling checking on status of refill. Asking to have enough until her appointment 5/19/2021. Hoping to get refill and call back today.     Please call patient back [phone]

## 2021-05-11 NOTE — TELEPHONE ENCOUNTER
Patient is calling today to check on the status of the refill below. Patient states she is out of the medication at this time. Patient has an upcoming physical appointment on 5/19/2021.  Patient did have an appointment sooner than this date but was 30 minut

## 2021-05-12 NOTE — TELEPHONE ENCOUNTER
Notified pt via voicemail (ok per verbal release) that refill was sent to pharmacy. Advised to call back with any questions.

## 2021-05-13 RX ORDER — ZOLPIDEM TARTRATE 5 MG/1
5 TABLET ORAL NIGHTLY
Qty: 30 TABLET | Refills: 1 | OUTPATIENT
Start: 2021-05-13

## 2021-05-13 NOTE — TELEPHONE ENCOUNTER
Yeimy/ Tito faxed PA form for Tramadol   Complete and fax to # [phone]  Routed to Rx Placed in [address] [place] [address] folder

## 2021-05-13 NOTE — TELEPHONE ENCOUNTER
Current refill request refused due to refill is either a duplicate request or has active refills at the pharmacy. Check previous templates.     Requested Prescriptions     Refused Prescriptions Disp Refills   • zolpidem 5 MG Oral Tab 30 tablet 1     Sig: T

## 2021-05-19 ENCOUNTER — OFFICE VISIT (OUTPATIENT)
Dept: INTERNAL MEDICINE CLINIC | Facility: CLINIC | Age: 56
End: 2021-05-19
Payer: COMMERCIAL

## 2021-05-19 VITALS
HEART RATE: 91 BPM | OXYGEN SATURATION: 99 % | HEIGHT: 65.5 IN | TEMPERATURE: 99 F | SYSTOLIC BLOOD PRESSURE: 126 MMHG | BODY MASS INDEX: 45.76 KG/M2 | DIASTOLIC BLOOD PRESSURE: 86 MMHG | WEIGHT: 278 LBS

## 2021-05-19 DIAGNOSIS — E66.01 CLASS 3 SEVERE OBESITY WITH BODY MASS INDEX (BMI) OF 40.0 TO 44.9 IN ADULT, UNSPECIFIED OBESITY TYPE, UNSPECIFIED WHETHER SERIOUS COMORBIDITY PRESENT (HCC): ICD-10-CM

## 2021-05-19 DIAGNOSIS — Z97.5 IUD (INTRAUTERINE DEVICE) IN PLACE: ICD-10-CM

## 2021-05-19 DIAGNOSIS — M19.90 OSTEOARTHRITIS, UNSPECIFIED OSTEOARTHRITIS TYPE, UNSPECIFIED SITE: ICD-10-CM

## 2021-05-19 DIAGNOSIS — R92.2 DENSE BREASTS: ICD-10-CM

## 2021-05-19 DIAGNOSIS — Z00.00 PHYSICAL EXAM: Primary | ICD-10-CM

## 2021-05-19 DIAGNOSIS — Z12.31 ENCOUNTER FOR SCREENING MAMMOGRAM FOR MALIGNANT NEOPLASM OF BREAST: ICD-10-CM

## 2021-05-19 DIAGNOSIS — I10 ESSENTIAL HYPERTENSION: ICD-10-CM

## 2021-05-19 PROCEDURE — 3074F SYST BP LT 130 MM HG: CPT | Performed by: INTERNAL MEDICINE

## 2021-05-19 PROCEDURE — 3079F DIAST BP 80-89 MM HG: CPT | Performed by: INTERNAL MEDICINE

## 2021-05-19 PROCEDURE — 99396 PREV VISIT EST AGE 40-64: CPT | Performed by: INTERNAL MEDICINE

## 2021-05-19 PROCEDURE — 3008F BODY MASS INDEX DOCD: CPT | Performed by: INTERNAL MEDICINE

## 2021-05-19 RX ORDER — AMLODIPINE BESYLATE 10 MG/1
10 TABLET ORAL
Qty: 90 TABLET | Refills: 1 | Status: SHIPPED | OUTPATIENT
Start: 2021-05-19 | End: 2021-10-12

## 2021-05-19 RX ORDER — ZOLPIDEM TARTRATE 5 MG/1
5 TABLET ORAL NIGHTLY
Qty: 30 TABLET | Refills: 2 | Status: SHIPPED | OUTPATIENT
Start: 2021-05-19 | End: 2021-08-04

## 2021-05-19 RX ORDER — TRAMADOL HYDROCHLORIDE 50 MG/1
50 TABLET ORAL EVERY 12 HOURS PRN
Qty: 60 TABLET | Refills: 0 | Status: SHIPPED | OUTPATIENT
Start: 2021-05-19 | End: 2021-07-12

## 2021-05-19 NOTE — PROGRESS NOTES
HPI:    Patient ID: Dilip Forrest is a 50year old female. Presents for physical.    Has not been seen by me in over 1 year. Continues to note intermittent low back pain--takes tramadol and this helps. Feels pain gets worse when she gains weight. and voice change. Eyes: Negative for pain and visual disturbance. Respiratory: Negative for cough, chest tightness and shortness of breath. Cardiovascular: Negative for chest pain and palpitations.    Gastrointestinal: Negative for abdominal pain, b Pupils: Pupils are equal, round, and reactive to light. Neck:      Vascular: No carotid bruit. Cardiovascular:      Rate and Rhythm: Normal rate and regular rhythm. Heart sounds: Normal heart sounds.    Pulmonary:      Effort: Pulmonary effort is n pain and occasional bilateral knee pain secondary to osteoarthritis. Again discussed the importance of weight loss to help with this. Patient does use tramadol or Aleve occasionally and this helps discomfort.     Patient is past due for screening colonosc

## 2021-05-19 NOTE — PROGRESS NOTES
HPI:    Patient ID: Herminia Burnett is a 50year old female. Presents for physical exam          Review of Systems   Constitutional: Negative for chills and fever.    HENT: Negative for congestion, hearing loss, postnasal drip, sinus pressure, sore throat (primary encounter diagnosis)  Dense breasts  Physical exam  Essential hypertension  Osteoarthritis, unspecified osteoarthritis type, unspecified site  Class 3 severe obesity with body mass index (bmi) of 40.0 to 44.9 in adult, unspecified obesity type, un

## 2021-05-21 NOTE — TELEPHONE ENCOUNTER
Pt call pt  She is being told a new Prior Authorization is required  Pharmacy told pt that new Prior Authorization is required with each refill   Tasked to [employer]

## 2021-05-21 NOTE — TELEPHONE ENCOUNTER
I spoke with patient's [facility]. Patient's insurance will only cover tramadol for a 28 day supply every 90 days. Explained that a prior authorization was faxed on May 14. Per pharmacist, the cost out of pocket for the prescription would be $49.99.  Left me

## 2021-05-24 ENCOUNTER — TELEPHONE (OUTPATIENT)
Dept: INTERNAL MEDICINE CLINIC | Facility: CLINIC | Age: 56
End: 2021-05-24

## 2021-05-24 NOTE — TELEPHONE ENCOUNTER
Bernardo faxed a PA request for Zolpidem   Ins. # [id number]  Ph.  # [phone]  Routed to Rx Placed in purple folder

## 2021-05-26 NOTE — TELEPHONE ENCOUNTER
Please call pt  for status on refill for Tramadol  Pt said pharmacy has sent request for Prior Authorization  Prior authorization may now be needed every 90 days  Tasked to [employer]

## 2021-05-26 NOTE — TELEPHONE ENCOUNTER
Spoke to [place], [place] for Tramadol is NOT needed (there is an active PA approval already on file x 1 year). RN was advised insurance will only approve dispense of #14 tablets (1 week supply) at a time and no more due to insurance cap/restructions; pt will need to PU weekly from pharmacy. Bernardo advised RN rx is ready for PU at this time. Spoke to pt and advised on the above; she verbalized understanding. Nakia Ching, it does appear PA still needed for Zolpidem if appropriate.

## 2021-07-12 ENCOUNTER — TELEPHONE (OUTPATIENT)
Dept: INTERNAL MEDICINE CLINIC | Facility: CLINIC | Age: 56
End: 2021-07-12

## 2021-07-12 RX ORDER — TRAMADOL HYDROCHLORIDE 50 MG/1
TABLET ORAL
Qty: 60 TABLET | Refills: 0 | Status: SHIPPED | OUTPATIENT
Start: 2021-07-12 | End: 2021-09-07

## 2021-07-12 NOTE — TELEPHONE ENCOUNTER
To MD:  The above refill request is for a controlled substance. Please review pended medication order. Print and sign for staff to fax to pharmacy or prescribe electronically.     Last office visit: 5/19/2021  Last time refill sent and quantity/refills:

## 2021-07-13 ENCOUNTER — HOSPITAL ENCOUNTER (OUTPATIENT)
Dept: MAMMOGRAPHY | Age: 56
Discharge: HOME | End: 2021-07-13
Attending: INTERNAL MEDICINE
Payer: COMMERCIAL

## 2021-07-13 ENCOUNTER — LAB ENCOUNTER (OUTPATIENT)
Dept: LAB | Age: 56
End: 2021-07-13
Attending: INTERNAL MEDICINE
Payer: COMMERCIAL

## 2021-07-30 ENCOUNTER — OFFICE VISIT (OUTPATIENT)
Dept: OBGYN CLINIC | Facility: CLINIC | Age: 56
End: 2021-07-30
Payer: COMMERCIAL

## 2021-07-30 VITALS
WEIGHT: 278 LBS | DIASTOLIC BLOOD PRESSURE: 85 MMHG | BODY MASS INDEX: 45.76 KG/M2 | HEART RATE: 97 BPM | HEIGHT: 65.5 IN | SYSTOLIC BLOOD PRESSURE: 150 MMHG

## 2021-07-30 DIAGNOSIS — Z30.432 ENCOUNTER FOR REMOVAL OF INTRAUTERINE CONTRACEPTIVE DEVICE: Primary | ICD-10-CM

## 2021-07-30 PROCEDURE — 3008F BODY MASS INDEX DOCD: CPT | Performed by: ADVANCED PRACTICE MIDWIFE

## 2021-07-30 PROCEDURE — 3077F SYST BP >= 140 MM HG: CPT | Performed by: ADVANCED PRACTICE MIDWIFE

## 2021-07-30 PROCEDURE — 58301 REMOVE INTRAUTERINE DEVICE: CPT | Performed by: ADVANCED PRACTICE MIDWIFE

## 2021-07-30 PROCEDURE — 3079F DIAST BP 80-89 MM HG: CPT | Performed by: ADVANCED PRACTICE MIDWIFE

## 2021-07-30 NOTE — PROCEDURES
IUD Removal     Here for IUD removal. Has had in for over 10 yrs. Menopausal for 2 yrs. Prior to that regular periods. Last month had right lower quadrant pain, mild. Felt like when first had IUD put in, so thought better have it removed.  Pain now resolved

## 2021-08-03 ENCOUNTER — TELEPHONE (OUTPATIENT)
Dept: INTERNAL MEDICINE CLINIC | Facility: CLINIC | Age: 56
End: 2021-08-03

## 2021-08-04 RX ORDER — ZOLPIDEM TARTRATE 5 MG/1
TABLET ORAL
Qty: 30 TABLET | Refills: 2 | Status: SHIPPED | OUTPATIENT
Start: 2021-08-04 | End: 2021-11-07

## 2021-08-04 NOTE — TELEPHONE ENCOUNTER
To MD:  The above refill request is for a controlled substance. Please review pended medication order. Print and sign for staff to fax to pharmacy or prescribe electronically.     Last office visit: 5/19/21  Last time refill sent and quantity/refills: 5/

## 2021-08-17 ENCOUNTER — HOSPITAL ENCOUNTER (OUTPATIENT)
Dept: MAMMOGRAPHY | Age: 56
Discharge: HOME | End: 2021-08-17
Attending: INTERNAL MEDICINE
Payer: COMMERCIAL

## 2021-08-17 DIAGNOSIS — Z12.31 ENCOUNTER FOR SCREENING MAMMOGRAM FOR MALIGNANT NEOPLASM OF BREAST: ICD-10-CM

## 2021-08-17 DIAGNOSIS — R92.2 DENSE BREASTS: ICD-10-CM

## 2021-08-17 PROCEDURE — 77063 BREAST TOMOSYNTHESIS BI: CPT | Performed by: INTERNAL MEDICINE

## 2021-08-17 PROCEDURE — 77067 SCR MAMMO BI INCL CAD: CPT | Performed by: INTERNAL MEDICINE

## 2021-09-04 ENCOUNTER — TELEPHONE (OUTPATIENT)
Dept: INTERNAL MEDICINE CLINIC | Facility: CLINIC | Age: 56
End: 2021-09-04

## 2021-09-07 RX ORDER — TRAMADOL HYDROCHLORIDE 50 MG/1
TABLET ORAL
Qty: 60 TABLET | Refills: 0 | Status: SHIPPED | OUTPATIENT
Start: 2021-09-07 | End: 2021-12-06

## 2021-09-08 NOTE — TELEPHONE ENCOUNTER
Pt called and states that [address] in [facility] told her that they need a prior authorization to fill the Tramadol HCL

## 2021-09-09 NOTE — TELEPHONE ENCOUNTER
Received a prior authorization via fax from [place] for traMADol HCI Tablets. Patient's ID #[id number]  Placed in purple folder.

## 2021-09-10 NOTE — TELEPHONE ENCOUNTER
PA faxed to [facility]. Confirmation received. PA placed in red folder.

## 2021-09-10 NOTE — TELEPHONE ENCOUNTER
PA for Tramadol received from [email or web address] fax.  [phone]  Ins. [id number]

## 2021-10-12 ENCOUNTER — LAB ENCOUNTER (OUTPATIENT)
Dept: LAB | Age: 56
End: 2021-10-12
Attending: INTERNAL MEDICINE
Payer: COMMERCIAL

## 2021-10-12 ENCOUNTER — OFFICE VISIT (OUTPATIENT)
Dept: INTERNAL MEDICINE CLINIC | Facility: CLINIC | Age: 56
End: 2021-10-12
Payer: COMMERCIAL

## 2021-10-12 VITALS
BODY MASS INDEX: 46.45 KG/M2 | HEIGHT: 65.5 IN | WEIGHT: 282.19 LBS | SYSTOLIC BLOOD PRESSURE: 128 MMHG | OXYGEN SATURATION: 100 % | DIASTOLIC BLOOD PRESSURE: 76 MMHG | TEMPERATURE: 98 F | HEART RATE: 100 BPM

## 2021-10-12 DIAGNOSIS — I10 ESSENTIAL HYPERTENSION: ICD-10-CM

## 2021-10-12 DIAGNOSIS — R10.9 ABDOMINAL PAIN, UNSPECIFIED ABDOMINAL LOCATION: Primary | ICD-10-CM

## 2021-10-12 DIAGNOSIS — Z00.00 PHYSICAL EXAM: ICD-10-CM

## 2021-10-12 DIAGNOSIS — E66.01 CLASS 3 SEVERE OBESITY WITH BODY MASS INDEX (BMI) OF 40.0 TO 44.9 IN ADULT, UNSPECIFIED OBESITY TYPE, UNSPECIFIED WHETHER SERIOUS COMORBIDITY PRESENT (HCC): ICD-10-CM

## 2021-10-12 DIAGNOSIS — M25.512 LEFT SHOULDER PAIN, UNSPECIFIED CHRONICITY: ICD-10-CM

## 2021-10-12 DIAGNOSIS — R10.9 ABDOMINAL PAIN, UNSPECIFIED ABDOMINAL LOCATION: ICD-10-CM

## 2021-10-12 PROCEDURE — 84443 ASSAY THYROID STIM HORMONE: CPT

## 2021-10-12 PROCEDURE — 80061 LIPID PANEL: CPT

## 2021-10-12 PROCEDURE — 36415 COLL VENOUS BLD VENIPUNCTURE: CPT

## 2021-10-12 PROCEDURE — 83036 HEMOGLOBIN GLYCOSYLATED A1C: CPT

## 2021-10-12 PROCEDURE — 99213 OFFICE O/P EST LOW 20 MIN: CPT | Performed by: INTERNAL MEDICINE

## 2021-10-12 PROCEDURE — 3074F SYST BP LT 130 MM HG: CPT | Performed by: INTERNAL MEDICINE

## 2021-10-12 PROCEDURE — 85025 COMPLETE CBC W/AUTO DIFF WBC: CPT

## 2021-10-12 PROCEDURE — 3008F BODY MASS INDEX DOCD: CPT | Performed by: INTERNAL MEDICINE

## 2021-10-12 PROCEDURE — 80053 COMPREHEN METABOLIC PANEL: CPT

## 2021-10-12 PROCEDURE — 3078F DIAST BP <80 MM HG: CPT | Performed by: INTERNAL MEDICINE

## 2021-10-12 PROCEDURE — 82607 VITAMIN B-12: CPT

## 2021-10-12 RX ORDER — OMEPRAZOLE 40 MG/1
40 CAPSULE, DELAYED RELEASE ORAL DAILY
Qty: 30 CAPSULE | Refills: 6 | Status: SHIPPED | OUTPATIENT
Start: 2021-10-12

## 2021-10-12 RX ORDER — AMLODIPINE BESYLATE 10 MG/1
10 TABLET ORAL
Qty: 90 TABLET | Refills: 3 | Status: SHIPPED | OUTPATIENT
Start: 2021-10-12

## 2021-10-12 NOTE — PROGRESS NOTES
HPI:    Patient ID: Yasmany Lawson is a 50year old female. Presents with c/o abd bloating and reflux symptons. Takes tums with improvement    Also notes left shoulder pain. Denies any precipitating known injury.   Discomfort is especially bothersome w Cardiovascular:      Rate and Rhythm: Normal rate and regular rhythm. Heart sounds: Normal heart sounds. Pulmonary:      Effort: Pulmonary effort is normal. No respiratory distress. Breath sounds: Normal breath sounds. No wheezing or rales. make reflux symptoms worse. We discussed referral to bariatric clinic for further management and patient is agreeable. She is noting some left shoulder pain which may be secondary to rotator cuff tendinitis. We will get physical therapy evaluation.

## 2021-10-15 ENCOUNTER — TELEPHONE (OUTPATIENT)
Dept: GASTROENTEROLOGY | Facility: CLINIC | Age: 56
End: 2021-10-15

## 2021-10-15 ENCOUNTER — OFFICE VISIT (OUTPATIENT)
Dept: GASTROENTEROLOGY | Facility: CLINIC | Age: 56
End: 2021-10-15
Payer: COMMERCIAL

## 2021-10-15 VITALS
HEIGHT: 65.5 IN | DIASTOLIC BLOOD PRESSURE: 75 MMHG | SYSTOLIC BLOOD PRESSURE: 100 MMHG | HEART RATE: 92 BPM | WEIGHT: 274.38 LBS | BODY MASS INDEX: 45.17 KG/M2

## 2021-10-15 DIAGNOSIS — K21.9 GASTROESOPHAGEAL REFLUX DISEASE, UNSPECIFIED WHETHER ESOPHAGITIS PRESENT: ICD-10-CM

## 2021-10-15 DIAGNOSIS — R10.13 DYSPEPSIA: ICD-10-CM

## 2021-10-15 DIAGNOSIS — E66.01 CLASS 3 SEVERE OBESITY WITH BODY MASS INDEX (BMI) OF 40.0 TO 44.9 IN ADULT, UNSPECIFIED OBESITY TYPE, UNSPECIFIED WHETHER SERIOUS COMORBIDITY PRESENT (HCC): Primary | ICD-10-CM

## 2021-10-15 DIAGNOSIS — K59.04 CHRONIC IDIOPATHIC CONSTIPATION: ICD-10-CM

## 2021-10-15 DIAGNOSIS — R13.10 DYSPHAGIA, UNSPECIFIED TYPE: ICD-10-CM

## 2021-10-15 DIAGNOSIS — Z12.11 COLON CANCER SCREENING: Primary | ICD-10-CM

## 2021-10-15 PROCEDURE — 99244 OFF/OP CNSLTJ NEW/EST MOD 40: CPT | Performed by: INTERNAL MEDICINE

## 2021-10-15 PROCEDURE — 3078F DIAST BP <80 MM HG: CPT | Performed by: INTERNAL MEDICINE

## 2021-10-15 PROCEDURE — 3074F SYST BP LT 130 MM HG: CPT | Performed by: INTERNAL MEDICINE

## 2021-10-15 PROCEDURE — 3008F BODY MASS INDEX DOCD: CPT | Performed by: INTERNAL MEDICINE

## 2021-10-15 RX ORDER — POLYETHYLENE GLYCOL 3350, SODIUM SULFATE ANHYDROUS, SODIUM BICARBONATE, SODIUM CHLORIDE, POTASSIUM CHLORIDE 236; 22.74; 6.74; 5.86; 2.97 G/4L; G/4L; G/4L; G/4L; G/4L
4 POWDER, FOR SOLUTION ORAL ONCE
Qty: 1 EACH | Refills: 0 | Status: SHIPPED | OUTPATIENT
Start: 2021-10-15 | End: 2021-10-15

## 2021-10-15 NOTE — TELEPHONE ENCOUNTER
Scheduled for:  Colonoscopy [address] ,[place] [address]   Provider Name:  Dr. Carmina Wilder  Date:  2/1/2022  Location:  [facility]   Sedation:  Mac   Time:  7530 Am (pt is aware to arrive at 0630 Am )   Prep: Golytely /Egd   Prep instructions were given to pt in the office, pt ve

## 2021-10-15 NOTE — PROGRESS NOTES
HPI:    Patient ID: Levi Camara is a 50year old woman with elevated BMI 45, history hypertension and elevated blood glucose levels who is referred by Dr. Rafy Bernard to discuss the following issues:    1.  GERD symptoms  · Recent, uncertain duration o (126.1 kg)  01/06/20 : 271 lb 6 oz (123.1 kg)  07/31/19 : 279 lb (126.6 kg)  05/21/18 : 297 lb (134.7 kg)  05/17/17 : 273 lb 3.2 oz (123.9 kg)        Previous EGD examination: n  Previous colonoscopy(ies): n    HPI    Review of Systems         Current Outp discontinuing all milk and yogurt  · Daily bowel regimen for the constipation as below  · Just starting omeprazole this week as above. · Takes Gas-X medication several times per week  · Consideration for FODMAP diet.   FODMAP diet handout issued and discus milk    B.   Daily dose of fiber - helps some people and makes some people worse (diarrhea or bloating)  -  Metamucil 1-2 heaping tablespoons in water or juice once or twice daily , also comes as pills which aren't as effective - take 4-6 pills per day  -

## 2021-10-15 NOTE — PATIENT INSTRUCTIONS
1. Daily laxative regimen to make you more regular. Goal is 1-2 easy bowel movements per day. Need to take every day or several times per week. Options:    A.   Mildest: Stool softeners - Colace/docusate - 2-6 pills per day (all at once or 1-2 twice per

## 2021-11-06 ENCOUNTER — TELEPHONE (OUTPATIENT)
Dept: INTERNAL MEDICINE CLINIC | Facility: CLINIC | Age: 56
End: 2021-11-06

## 2021-11-07 RX ORDER — ZOLPIDEM TARTRATE 5 MG/1
TABLET ORAL
Qty: 30 TABLET | Refills: 2 | Status: SHIPPED | OUTPATIENT
Start: 2021-11-07 | End: 2022-02-02

## 2021-11-08 NOTE — TELEPHONE ENCOUNTER
To MD:  The above refill request is for a controlled substance. Please review pended medication order. Print and sign for staff to fax to pharmacy or prescribe electronically.     Last office visit: 10/12/21  Last time refill sent and quantity/refills: 8

## 2021-12-06 RX ORDER — TRAMADOL HYDROCHLORIDE 50 MG/1
TABLET ORAL
Qty: 60 TABLET | Refills: 0 | Status: SHIPPED | OUTPATIENT
Start: 2021-12-06 | End: 2022-02-02

## 2021-12-06 NOTE — TELEPHONE ENCOUNTER
To Dr. Chestine Lanes----    The above refill request is for a controlled substance. Please review pended medication order.       Lov: 10/12/21  Prescribed: 9/7/21 #60   : 9/14/21 #60

## 2021-12-07 ENCOUNTER — IMMUNIZATION (OUTPATIENT)
Dept: LAB | Facility: HOSPITAL | Age: 56
End: 2021-12-07
Attending: EMERGENCY MEDICINE
Payer: COMMERCIAL

## 2021-12-07 DIAGNOSIS — Z23 NEED FOR VACCINATION: Primary | ICD-10-CM

## 2021-12-07 PROCEDURE — 0004A SARSCOV2 VAC 30MCG/0.3ML IM: CPT

## 2022-01-27 NOTE — PAT NURSING NOTE
This RN spoke with patient to go over pre procedure information for appointment on 2/1/22. Patient was informed that she cannot take an uber home and will need to have someone available to drive her home. She was instructed to call PAT office back if  wants to come into the hospital so that we can add them as her care partner. RN also supplied patient with Md office number for any questions on prep instructions. She was unsure if she had them still or not.

## 2022-01-28 ENCOUNTER — TELEPHONE (OUTPATIENT)
Dept: GASTROENTEROLOGY | Facility: CLINIC | Age: 57
End: 2022-01-28

## 2022-01-28 NOTE — TELEPHONE ENCOUNTER
Patient contacted. I helped her access her MyChart over the phone and she was able to view the instructions that I sent today. All questions answered. Reviewed how to drink the split dose prep.

## 2022-01-28 NOTE — TELEPHONE ENCOUNTER
Pt has procedure Tuesday and needs instructions for prep and diet and needs to know where she is going and what time.  Please call

## 2022-01-29 ENCOUNTER — LAB ENCOUNTER (OUTPATIENT)
Dept: LAB | Facility: HOSPITAL | Age: 57
End: 2022-01-29
Attending: INTERNAL MEDICINE
Payer: COMMERCIAL

## 2022-01-29 DIAGNOSIS — Z01.818 PRE-OP TESTING: ICD-10-CM

## 2022-01-30 LAB — SARS-COV-2 RNA RESP QL NAA+PROBE: NOT DETECTED

## 2022-01-31 ENCOUNTER — TELEPHONE (OUTPATIENT)
Dept: GASTROENTEROLOGY | Facility: CLINIC | Age: 57
End: 2022-01-31

## 2022-01-31 NOTE — TELEPHONE ENCOUNTER
Spoke to Endo & pt, unable to move pt to a later time on 2/1/22, Dr. Mari Blue schedule is full & Endo will be moving pt's up (per Elena). Went over prep instructions with pt & answered prep questions that she had. Pt verbally understood.     TE neisha

## 2022-02-01 ENCOUNTER — ANESTHESIA (OUTPATIENT)
Dept: ENDOSCOPY | Facility: HOSPITAL | Age: 57
End: 2022-02-01
Payer: COMMERCIAL

## 2022-02-01 ENCOUNTER — HOSPITAL ENCOUNTER (OUTPATIENT)
Facility: HOSPITAL | Age: 57
Setting detail: HOSPITAL OUTPATIENT SURGERY
Discharge: HOME | End: 2022-02-01
Attending: INTERNAL MEDICINE | Admitting: INTERNAL MEDICINE
Payer: COMMERCIAL

## 2022-02-01 ENCOUNTER — ANESTHESIA EVENT (OUTPATIENT)
Dept: ENDOSCOPY | Facility: HOSPITAL | Age: 57
End: 2022-02-01
Payer: COMMERCIAL

## 2022-02-01 ENCOUNTER — TELEPHONE (OUTPATIENT)
Dept: INTERNAL MEDICINE CLINIC | Facility: CLINIC | Age: 57
End: 2022-02-01

## 2022-02-01 VITALS
WEIGHT: 279 LBS | TEMPERATURE: 98 F | HEART RATE: 80 BPM | BODY MASS INDEX: 44.84 KG/M2 | RESPIRATION RATE: 21 BRPM | OXYGEN SATURATION: 96 % | DIASTOLIC BLOOD PRESSURE: 85 MMHG | SYSTOLIC BLOOD PRESSURE: 136 MMHG | HEIGHT: 66 IN

## 2022-02-01 DIAGNOSIS — Z01.818 PRE-OP TESTING: Primary | ICD-10-CM

## 2022-02-01 DIAGNOSIS — R13.10 DYSPHAGIA, UNSPECIFIED TYPE: ICD-10-CM

## 2022-02-01 DIAGNOSIS — R10.13 DYSPEPSIA: ICD-10-CM

## 2022-02-01 DIAGNOSIS — Z12.11 COLON CANCER SCREENING: ICD-10-CM

## 2022-02-01 DIAGNOSIS — K21.9 GASTROESOPHAGEAL REFLUX DISEASE, UNSPECIFIED WHETHER ESOPHAGITIS PRESENT: ICD-10-CM

## 2022-02-01 DIAGNOSIS — K59.04 CHRONIC IDIOPATHIC CONSTIPATION: ICD-10-CM

## 2022-02-01 PROCEDURE — 0DJD8ZZ INSPECTION OF LOWER INTESTINAL TRACT, VIA NATURAL OR ARTIFICIAL OPENING ENDOSCOPIC: ICD-10-PCS | Performed by: INTERNAL MEDICINE

## 2022-02-01 PROCEDURE — 45378 DIAGNOSTIC COLONOSCOPY: CPT | Performed by: INTERNAL MEDICINE

## 2022-02-01 PROCEDURE — 43235 EGD DIAGNOSTIC BRUSH WASH: CPT | Performed by: INTERNAL MEDICINE

## 2022-02-01 PROCEDURE — 0DJ08ZZ INSPECTION OF UPPER INTESTINAL TRACT, VIA NATURAL OR ARTIFICIAL OPENING ENDOSCOPIC: ICD-10-PCS | Performed by: INTERNAL MEDICINE

## 2022-02-01 RX ORDER — SODIUM CHLORIDE, SODIUM LACTATE, POTASSIUM CHLORIDE, CALCIUM CHLORIDE 600; 310; 30; 20 MG/100ML; MG/100ML; MG/100ML; MG/100ML
INJECTION, SOLUTION INTRAVENOUS CONTINUOUS
Status: DISCONTINUED | OUTPATIENT
Start: 2022-02-01 | End: 2022-02-01

## 2022-02-01 RX ORDER — LIDOCAINE HYDROCHLORIDE 10 MG/ML
INJECTION, SOLUTION EPIDURAL; INFILTRATION; INTRACAUDAL; PERINEURAL AS NEEDED
Status: DISCONTINUED | OUTPATIENT
Start: 2022-02-01 | End: 2022-02-01 | Stop reason: SURG

## 2022-02-01 RX ORDER — NALOXONE HYDROCHLORIDE 0.4 MG/ML
80 INJECTION, SOLUTION INTRAMUSCULAR; INTRAVENOUS; SUBCUTANEOUS AS NEEDED
Status: DISCONTINUED | OUTPATIENT
Start: 2022-02-01 | End: 2022-02-01

## 2022-02-01 RX ADMIN — LIDOCAINE HYDROCHLORIDE 40 MG: 10 INJECTION, SOLUTION EPIDURAL; INFILTRATION; INTRACAUDAL; PERINEURAL at 07:50:00

## 2022-02-01 RX ADMIN — SODIUM CHLORIDE, SODIUM LACTATE, POTASSIUM CHLORIDE, CALCIUM CHLORIDE: 600; 310; 30; 20 INJECTION, SOLUTION INTRAVENOUS at 07:50:00

## 2022-02-01 NOTE — ANESTHESIA POSTPROCEDURE EVALUATION
Patient: Christa Green    Procedure Summary     Date: 02/01/22 Room / Location: [facility] ENDOSCOPY 05 / [facility] ENDOSCOPY    Anesthesia Start: 3639 Anesthesia Stop: 0831    Procedures:       COLONOSCOPY/ESOPHAGOGASTRODUODENOSCOPY (EGD) (N/A )      ESOPHAGOGASTRODUODENOSCOPY (EGD) (N/A ) Diagnosis:       Colon cancer screening      Dyspepsia      Dysphagia, unspecified type      Gastroesophageal reflux disease, unspecified whether esophagitis present      Chronic idiopathic constipation      (hemorrhoids, normal egd)    Surgeons: Mena Carrillo MD Anesthesiologist: Hamzah Cárdenas MD    Anesthesia Type: MAC ASA Status: 3          Anesthesia Type: MAC    PACU Vitals    BP (!) 148/108 (02/01/22 0830)    Temp      Pulse 88 (02/01/22 0830)   Resp 14 (02/01/22 0830)    SpO2 96 % (02/01/22 0830)        [facility] AN Post Evaluation:   Patient Evaluated in PACU  Patient Participation: complete - patient participated  Level of Consciousness: awake and alert  Pain Management: adequate  Airway Patency:patent  Yes    Cardiovascular Status: acceptable  Respiratory Status: acceptable  Postoperative Hydration acceptable      Sylvia Vela MD  2/1/2022 8:31 AM

## 2022-02-02 RX ORDER — ZOLPIDEM TARTRATE 5 MG/1
TABLET ORAL
Qty: 30 TABLET | Refills: 1 | Status: SHIPPED | OUTPATIENT
Start: 2022-02-05

## 2022-02-02 RX ORDER — TRAMADOL HYDROCHLORIDE 50 MG/1
TABLET ORAL
Qty: 60 TABLET | Refills: 1 | Status: SHIPPED | OUTPATIENT
Start: 2022-02-02

## 2022-02-02 NOTE — TELEPHONE ENCOUNTER
To MD:  The above refill request is for a controlled substance. Please review pended medication order. Print and sign for staff to fax to pharmacy or prescribe electronically.     Last office visit: 10/12/21  Last time refill sent and quantity/refills:  Tramadol 12/6/21 #60 with 0  Zolpidem 11/7/21 #30 with 2    Zolpidem rx pending with earliest dispense date for 2/5

## 2022-02-04 ENCOUNTER — TELEPHONE (OUTPATIENT)
Dept: INTERNAL MEDICINE CLINIC | Facility: CLINIC | Age: 57
End: 2022-02-04

## 2022-02-08 NOTE — TELEPHONE ENCOUNTER
PA was completed and approval received 2021. Spoke to [facility] who reports an additional PA is required as previous PA  2021. PA Completed and faxed; confirmation received. To red folder.

## 2022-02-15 ENCOUNTER — PATIENT MESSAGE (OUTPATIENT)
Dept: INTERNAL MEDICINE CLINIC | Facility: CLINIC | Age: 57
End: 2022-02-15

## 2022-02-15 NOTE — TELEPHONE ENCOUNTER
Received Prior Auth form with the following message from the clinical review dept: We are unable to locate the patient's pharmacy coverage with [employer].   Please contact the insurance plan at the number on the back of pt's insurance card     Fax placed in red folder

## 2022-02-15 NOTE — TELEPHONE ENCOUNTER
This same form was already completed and faxed on 2/8/22, however RN reviewed red folder and unable to find to refax. Form completed again and faxed. To Red folder.

## 2022-04-09 ENCOUNTER — TELEPHONE (OUTPATIENT)
Dept: INTERNAL MEDICINE CLINIC | Facility: CLINIC | Age: 57
End: 2022-04-09

## 2022-04-11 RX ORDER — ZOLPIDEM TARTRATE 5 MG/1
TABLET ORAL
Qty: 30 TABLET | Refills: 0 | Status: SHIPPED | OUTPATIENT
Start: 2022-04-11

## 2022-04-11 NOTE — TELEPHONE ENCOUNTER
To Dr. Renita Dupree---    The above refill request is for a controlled substance. Please review pended medication order.        lov 10/12/21  Prescribed: #30 with 1 2/5/22   : n/a

## 2022-05-06 ENCOUNTER — TELEPHONE (OUTPATIENT)
Dept: INTERNAL MEDICINE CLINIC | Facility: CLINIC | Age: 57
End: 2022-05-06

## 2022-05-06 RX ORDER — IBUPROFEN 600 MG/1
600 TABLET ORAL 3 TIMES DAILY PRN
Qty: 30 TABLET | Refills: 1 | Status: SHIPPED | OUTPATIENT
Start: 2022-05-06

## 2022-05-06 NOTE — TELEPHONE ENCOUNTER
Spoke to Menomineepaul yoo  She thinks she might have gout to her right foot. She has never had gout before but says it runs in her family. She complains of pain to the outer part of her right foot, the area in between her arch and her heel. States her right ankle is slightly swollen. Claims she has noticed this swelling at times previously as well. States the area where she has the pain (outer part of right foot) looks slightly swollen as well. Denies redness or discoloration to her foot or leg. Denies warmth to the area. Says she was feeling this pain gradually from time to time previously. Sometimes when she woke up in the morning would have the pain but would go away after she started walking. Yesterday it flared up worse than before. [place] the airport and had one beer with her sandwich. When she stood up, she felt pain to her foot and it felt tight like a ball. When she presses on the area she says she feels something, like \"an actual ball\"   It feels different than her left foot. There is no pain to her left foot. She denies swelling to her right calf. She denies pain to her right calf. She denies chest pain or shortness of breath. She has been trying to keep foot elevated and did try ibuprofen. Ibuprofen did seem to help a little but the area is still painful when she presses down on it. She feels slight pain always, but more when she puts pressure on it.     She flew to [employer] yesterday  [facility] [employer] [facility]    To Dr Santino Olivares to please advise

## 2022-05-06 NOTE — TELEPHONE ENCOUNTER
Symptoms that patient is expressing are not classic for gout, though this cannot be ruled out. Patient may also have strain. Nonetheless, we can treat both with course of nonsteroidal anti-inflammatory--would recommend ibuprofen 600 mg p.o. 3 times daily as needed. However if patient feels symptoms are not improving, she should have this evaluated at urgent care center in [address] [place] [address].   Can send off prescription for ibuprofen for 30 tablets with 1 refill

## 2022-05-06 NOTE — TELEPHONE ENCOUNTER
Pt. Called back. She gave the wrong pharmacy info to the nurse. The correct pharmacy is [facility]. Krystyna Rivera. In [address] [place] [address]. Phone # is [phone].

## 2022-05-06 NOTE — TELEPHONE ENCOUNTER
Pt. Called stating she thinks she is having gout in her rt. Foot. She is having pain on the outer aspect of her right foot near the heel. It feels like there is a knot in there and is tight. She said there is a little swelling in that area and the lower ankle. She has never had gout before but it does run in her family. She is hoping Dr. Shruthi Mascorro can prescribe something for her. She is currently in [address] [place] [address], and is hoping to enjoy her time there. Pt. Can be reached at [phone].

## 2022-05-06 NOTE — TELEPHONE ENCOUNTER
Spoke to patient and relayed Dr Yoselin Chou message to her. She verbalized understanding. eRX sent for ibuprofen as ordered per Dr Trudy Anderson to patient's requested pharmacy in [place], [place]. Reviewed with patient that she should not take this medication on an empty stomach.

## 2022-05-12 ENCOUNTER — TELEPHONE (OUTPATIENT)
Dept: INTERNAL MEDICINE CLINIC | Facility: CLINIC | Age: 57
End: 2022-05-12

## 2022-05-12 RX ORDER — ZOLPIDEM TARTRATE 5 MG/1
TABLET ORAL
Qty: 30 TABLET | Refills: 0 | Status: SHIPPED | OUTPATIENT
Start: 2022-05-12 | End: 2022-05-13

## 2022-05-12 NOTE — TELEPHONE ENCOUNTER
To MD:  The above refill request is for a controlled substance. Please review pended medication order. Print and sign for staff to fax to pharmacy or prescribe electronically.     Last office visit: 10/12/2021  Last time refill sent and quantity/refills: 4/11//2022 #30/0

## 2022-05-13 ENCOUNTER — OFFICE VISIT (OUTPATIENT)
Dept: INTERNAL MEDICINE CLINIC | Facility: CLINIC | Age: 57
End: 2022-05-13
Payer: COMMERCIAL

## 2022-05-13 VITALS
HEART RATE: 94 BPM | HEIGHT: 66 IN | OXYGEN SATURATION: 99 % | TEMPERATURE: 99 F | DIASTOLIC BLOOD PRESSURE: 80 MMHG | BODY MASS INDEX: 46.25 KG/M2 | SYSTOLIC BLOOD PRESSURE: 120 MMHG | WEIGHT: 287.81 LBS

## 2022-05-13 DIAGNOSIS — I10 ESSENTIAL HYPERTENSION: Primary | ICD-10-CM

## 2022-05-13 DIAGNOSIS — M25.571 RIGHT ANKLE PAIN, UNSPECIFIED CHRONICITY: ICD-10-CM

## 2022-05-13 PROCEDURE — 99213 OFFICE O/P EST LOW 20 MIN: CPT | Performed by: INTERNAL MEDICINE

## 2022-05-13 PROCEDURE — 3079F DIAST BP 80-89 MM HG: CPT | Performed by: INTERNAL MEDICINE

## 2022-05-13 PROCEDURE — 3008F BODY MASS INDEX DOCD: CPT | Performed by: INTERNAL MEDICINE

## 2022-05-13 PROCEDURE — 3074F SYST BP LT 130 MM HG: CPT | Performed by: INTERNAL MEDICINE

## 2022-05-13 RX ORDER — OMEPRAZOLE 40 MG/1
40 CAPSULE, DELAYED RELEASE ORAL DAILY
Qty: 30 CAPSULE | Refills: 6 | Status: SHIPPED | OUTPATIENT
Start: 2022-05-13

## 2022-05-13 RX ORDER — ZOLPIDEM TARTRATE 5 MG/1
5 TABLET ORAL NIGHTLY
Qty: 30 TABLET | Refills: 0 | Status: SHIPPED | OUTPATIENT
Start: 2022-05-13

## 2022-05-13 RX ORDER — AMLODIPINE BESYLATE 10 MG/1
10 TABLET ORAL
Qty: 90 TABLET | Refills: 3 | Status: SHIPPED | OUTPATIENT
Start: 2022-05-13

## 2022-05-13 RX ORDER — TRAMADOL HYDROCHLORIDE 50 MG/1
50 TABLET ORAL EVERY 12 HOURS PRN
Qty: 60 TABLET | Refills: 1 | Status: SHIPPED | OUTPATIENT
Start: 2022-05-13

## 2022-05-13 RX ORDER — IBUPROFEN 600 MG/1
600 TABLET ORAL 3 TIMES DAILY PRN
Qty: 30 TABLET | Refills: 1 | Status: SHIPPED | OUTPATIENT
Start: 2022-05-13

## 2022-05-13 RX ORDER — MELOXICAM 15 MG/1
15 TABLET ORAL DAILY
Qty: 30 TABLET | Refills: 0 | Status: SHIPPED | OUTPATIENT
Start: 2022-05-13

## 2022-06-14 ENCOUNTER — TELEPHONE (OUTPATIENT)
Dept: INTERNAL MEDICINE CLINIC | Facility: CLINIC | Age: 57
End: 2022-06-14

## 2022-06-16 NOTE — TELEPHONE ENCOUNTER
Called and left message to call back as pt was given meloxicam on 5/13/2022 for short term relief of ankle pain. Is she in need of refill?  If pain continues Dr. Modesto Klein states in note he will consider Podiatric referral

## 2022-06-17 ENCOUNTER — HOSPITAL ENCOUNTER (OUTPATIENT)
Dept: GENERAL RADIOLOGY | Age: 57
Discharge: HOME | End: 2022-06-17
Attending: INTERNAL MEDICINE
Payer: COMMERCIAL

## 2022-06-17 DIAGNOSIS — M25.571 RIGHT ANKLE PAIN, UNSPECIFIED CHRONICITY: ICD-10-CM

## 2022-06-17 PROCEDURE — 73610 X-RAY EXAM OF ANKLE: CPT | Performed by: INTERNAL MEDICINE

## 2022-06-17 RX ORDER — MELOXICAM 15 MG/1
TABLET ORAL
Qty: 30 TABLET | Refills: 0 | Status: SHIPPED | OUTPATIENT
Start: 2022-06-17

## 2022-06-17 NOTE — TELEPHONE ENCOUNTER
Marija Penny called back. She did request the refill for meloxicam.   She says it does help. She is out of medication. She likes taking it better than ibuprofen. It lasts all day and she only takes one pill. The pain and swelling is still about the same. She forgot Dr Trudy Anderson had ordered an xray. I advised that she get this done. She will call to schedule xray.     To Dr Clarissa Moffett to advise on refill of meloxicam

## 2022-06-24 ENCOUNTER — TELEPHONE (OUTPATIENT)
Dept: INTERNAL MEDICINE CLINIC | Facility: CLINIC | Age: 57
End: 2022-06-24

## 2022-06-24 NOTE — TELEPHONE ENCOUNTER
----- Message from Zhane Castañeda MD sent at 6/24/2022  6:49 AM CDT -----  Patient has not viewed my chart result note.   Please notify patient

## 2022-06-24 NOTE — TELEPHONE ENCOUNTER
Spoke to pt and relayed MD message and instructions. Pt verbalized understanding. She feels that the meloxicam is helping. For now she would like to see how she continues to feel with the meloxicam. She will call us if she decides a podiatry referral is needed.

## 2022-08-11 ENCOUNTER — OFFICE VISIT (OUTPATIENT)
Dept: INTERNAL MEDICINE CLINIC | Facility: CLINIC | Age: 57
End: 2022-08-11
Payer: COMMERCIAL

## 2022-08-11 VITALS
BODY MASS INDEX: 45.64 KG/M2 | SYSTOLIC BLOOD PRESSURE: 140 MMHG | OXYGEN SATURATION: 98 % | WEIGHT: 284 LBS | DIASTOLIC BLOOD PRESSURE: 86 MMHG | HEART RATE: 93 BPM | HEIGHT: 66 IN | TEMPERATURE: 98 F

## 2022-08-11 DIAGNOSIS — M25.552 BILATERAL HIP PAIN: ICD-10-CM

## 2022-08-11 DIAGNOSIS — M54.9 BACK PAIN, UNSPECIFIED BACK LOCATION, UNSPECIFIED BACK PAIN LATERALITY, UNSPECIFIED CHRONICITY: Primary | ICD-10-CM

## 2022-08-11 DIAGNOSIS — M25.551 BILATERAL HIP PAIN: ICD-10-CM

## 2022-08-11 DIAGNOSIS — I10 ESSENTIAL HYPERTENSION: ICD-10-CM

## 2022-08-11 PROCEDURE — 99213 OFFICE O/P EST LOW 20 MIN: CPT | Performed by: INTERNAL MEDICINE

## 2022-08-11 PROCEDURE — 3008F BODY MASS INDEX DOCD: CPT | Performed by: INTERNAL MEDICINE

## 2022-08-11 PROCEDURE — 3079F DIAST BP 80-89 MM HG: CPT | Performed by: INTERNAL MEDICINE

## 2022-08-11 PROCEDURE — 3077F SYST BP >= 140 MM HG: CPT | Performed by: INTERNAL MEDICINE

## 2022-08-11 RX ORDER — MELOXICAM 15 MG/1
15 TABLET ORAL DAILY
Qty: 30 TABLET | Refills: 0 | Status: SHIPPED | OUTPATIENT
Start: 2022-08-11

## 2022-08-12 ENCOUNTER — PATIENT MESSAGE (OUTPATIENT)
Dept: INTERNAL MEDICINE CLINIC | Facility: CLINIC | Age: 57
End: 2022-08-12

## 2022-08-12 RX ORDER — TRAMADOL HYDROCHLORIDE 50 MG/1
50 TABLET ORAL EVERY 12 HOURS PRN
Qty: 60 TABLET | Refills: 1 | Status: SHIPPED | OUTPATIENT
Start: 2022-08-12

## 2022-08-12 RX ORDER — MELOXICAM 15 MG/1
TABLET ORAL
Qty: 30 TABLET | Refills: 0 | OUTPATIENT
Start: 2022-08-12

## 2022-08-12 RX ORDER — MELOXICAM 15 MG/1
TABLET ORAL
Qty: 90 TABLET | Refills: 0 | OUTPATIENT
Start: 2022-08-12

## 2022-08-12 RX ORDER — ZOLPIDEM TARTRATE 5 MG/1
5 TABLET ORAL NIGHTLY
Qty: 30 TABLET | Refills: 1 | Status: SHIPPED | OUTPATIENT
Start: 2022-08-12

## 2022-08-12 NOTE — TELEPHONE ENCOUNTER
To MD:  The above refill request is for a controlled substance. Please review pended medication order. Print and sign for staff to fax to pharmacy or prescribe electronically.     Last office visit: 8/11/22  Last time refill sent and quantity/refills:  Zolpidem 5/13/22 #30 with 0 Per [place]    Tramadol 5/13/22 #60 with 1

## 2022-08-12 NOTE — TELEPHONE ENCOUNTER
From: Snow Latham  To:  Cheryl Weber MD  Sent: 8/12/2022 9:03 AM CDT  Subject: Medication    I just saw not to hear that yesterday is there a problem with filling my prescription he was going to fill all my prescriptions

## 2022-08-12 NOTE — TELEPHONE ENCOUNTER
From: Monique Warren  Sent: 8/12/2022 9:00 AM CDT  To: [facility] Clinical Staff  Subject: Update    I just saw Dr Sarah Linares yesterday he was supposed to refill it for me and I also would like refills on all my medication I'm not out but I will be soon

## 2022-08-17 ENCOUNTER — IMMUNIZATION (OUTPATIENT)
Dept: LAB | Age: 57
End: 2022-08-17
Attending: EMERGENCY MEDICINE
Payer: COMMERCIAL

## 2022-08-17 DIAGNOSIS — Z23 NEED FOR VACCINATION: Primary | ICD-10-CM

## 2022-08-17 PROCEDURE — 0054A SARSCOV2 VAC 30MCG TRS SUCR: CPT

## 2022-09-14 ENCOUNTER — TELEPHONE (OUTPATIENT)
Dept: INTERNAL MEDICINE CLINIC | Facility: CLINIC | Age: 57
End: 2022-09-14

## 2022-09-16 ENCOUNTER — PATIENT MESSAGE (OUTPATIENT)
Dept: INTERNAL MEDICINE CLINIC | Facility: CLINIC | Age: 57
End: 2022-09-16

## 2022-09-16 NOTE — TELEPHONE ENCOUNTER
LOV 8/11/22:    \"We will also resume meloxicam 15 mg daily until further evaluation by physical therapy. \"    mychart to pt, I do not see she saw PT yet

## 2022-09-19 NOTE — TELEPHONE ENCOUNTER
Patient calling back regarding refill. Has not followed up with physical therapy, states she doesn't have the time because she is working two jobs. Symptoms are getting worse and has lost weight as Dr. Elizabeth Jacobson suggested. Still having pain even after loosing weight. Feels that pain is worse. Pain is in both hips and upper right groin area. Can't stand for a any length time, needs to bend over to get relief. Taking Tylenol and Tramadol at the same, still no relief. Patient is asking if she should have a MRI.     Best call back number [phone]

## 2022-09-20 RX ORDER — MELOXICAM 15 MG/1
TABLET ORAL
Qty: 90 TABLET | Refills: 0 | Status: SHIPPED | OUTPATIENT
Start: 2022-09-20

## 2022-09-20 NOTE — TELEPHONE ENCOUNTER
Okay to continue meloxicam.  However meloxicam and itself will not be enough to correct issue and I continue to recommend physical therapy as the primary treatment to help with the symptoms.

## 2022-09-27 RX ORDER — ZOLPIDEM TARTRATE 5 MG/1
TABLET ORAL
Qty: 30 TABLET | Refills: 0 | OUTPATIENT
Start: 2022-09-27

## 2022-09-27 RX ORDER — TRAMADOL HYDROCHLORIDE 50 MG/1
TABLET ORAL
Qty: 60 TABLET | Refills: 0 | OUTPATIENT
Start: 2022-09-27

## 2022-09-27 NOTE — TELEPHONE ENCOUNTER
From: José Miguel Nava  Sent: 9/27/2022 3:16 PM CDT  To: Juli [facility] Clinical Staff  Subject: refill    I plan to go soon

## 2022-10-20 ENCOUNTER — TELEPHONE (OUTPATIENT)
Dept: INTERNAL MEDICINE CLINIC | Facility: CLINIC | Age: 57
End: 2022-10-20

## 2022-10-20 NOTE — TELEPHONE ENCOUNTER
Received a request via fax for a special request for last 3 years records from [employer].

## 2022-10-25 ENCOUNTER — TELEPHONE (OUTPATIENT)
Dept: INTERNAL MEDICINE CLINIC | Facility: CLINIC | Age: 57
End: 2022-10-25

## 2022-10-26 RX ORDER — TRAMADOL HYDROCHLORIDE 50 MG/1
50 TABLET ORAL EVERY 12 HOURS PRN
Qty: 60 TABLET | Refills: 1 | Status: SHIPPED | OUTPATIENT
Start: 2022-10-26

## 2022-10-26 NOTE — TELEPHONE ENCOUNTER
To MD:  The above refill request is for a controlled substance. Please review pended medication order. Print and sign for staff to fax to pharmacy or prescribe electronically.     Last office visit: 8/11/22  Last time refill sent and quantity/refills: 8/12/22 #60 with 1   Per [place]  last dispensed 9/27/22

## 2022-11-19 ENCOUNTER — TELEPHONE (OUTPATIENT)
Dept: INTERNAL MEDICINE CLINIC | Facility: CLINIC | Age: 57
End: 2022-11-19

## 2022-11-21 RX ORDER — IBUPROFEN 600 MG/1
600 TABLET ORAL 3 TIMES DAILY PRN
Qty: 30 TABLET | Refills: 1 | Status: SHIPPED | OUTPATIENT
Start: 2022-11-21

## 2022-11-21 RX ORDER — ZOLPIDEM TARTRATE 5 MG/1
5 TABLET ORAL NIGHTLY
Qty: 30 TABLET | Refills: 1 | Status: SHIPPED | OUTPATIENT
Start: 2022-11-21

## 2022-11-21 NOTE — TELEPHONE ENCOUNTER
To MD:  The above refill request is for a controlled substance. Please review pended medication order. Print and sign for staff to fax to pharmacy or prescribe electronically. Last office visit: 8/11/22  Last time refill sent and quantity/refills: 8/12/22 #30 with 1     TO Dr. Leigh Merritt to advise on zolpidem and if OK to continue filling ibuprofen with meloxicam added to med regimen at last visit. Thanks! Also to [facility] to please call patient to schedule annual physical, patient has only been seen for acute symptoms this year. Thanks!

## 2022-11-21 NOTE — TELEPHONE ENCOUNTER
Called patient on 11/21/22 per nurse request to schedule annual physical.  Scheduled physical for 1/4/23.

## 2022-12-27 RX ORDER — MELOXICAM 15 MG/1
TABLET ORAL
Qty: 30 TABLET | Refills: 0 | Status: SHIPPED | OUTPATIENT
Start: 2022-12-27

## 2022-12-28 RX ORDER — MELOXICAM 15 MG/1
TABLET ORAL
Qty: 90 TABLET | Refills: 0 | OUTPATIENT
Start: 2022-12-28

## 2023-01-02 ENCOUNTER — TELEPHONE (OUTPATIENT)
Dept: INTERNAL MEDICINE CLINIC | Facility: CLINIC | Age: 58
End: 2023-01-02

## 2023-01-04 RX ORDER — TRAMADOL HYDROCHLORIDE 50 MG/1
TABLET ORAL
Qty: 60 TABLET | Refills: 5 | Status: SHIPPED | OUTPATIENT
Start: 2023-01-04

## 2023-01-04 NOTE — TELEPHONE ENCOUNTER
Pt called, requesting refill until she can get scheduled with new physician as Dr Leigh Merritt no longer in network with [facility]  She cannot be seen by a new physician for at least 30 days  Tasked to [employer]

## 2023-01-04 NOTE — TELEPHONE ENCOUNTER
To MD:  The above refill request is for a controlled substance. Please review pended medication order. Print and sign for staff to fax to pharmacy or prescribe electronically.       Refills added as pt has been on since 2017;  Review if change needed  Last office visit:   8/11/22  Last time refill sent and quantity/refills:  11/30/22

## 2023-01-05 RX ORDER — ZOLPIDEM TARTRATE 5 MG/1
5 TABLET ORAL NIGHTLY
Qty: 30 TABLET | Refills: 1 | OUTPATIENT
Start: 2023-01-05

## 2023-01-05 RX ORDER — IBUPROFEN 600 MG/1
600 TABLET ORAL 3 TIMES DAILY PRN
Qty: 30 TABLET | Refills: 0 | Status: SHIPPED | OUTPATIENT
Start: 2023-01-05

## 2023-01-05 RX ORDER — AMLODIPINE BESYLATE 10 MG/1
10 TABLET ORAL
Qty: 90 TABLET | Refills: 3 | OUTPATIENT
Start: 2023-01-05

## 2023-01-05 RX ORDER — OMEPRAZOLE 40 MG/1
40 CAPSULE, DELAYED RELEASE ORAL DAILY
Qty: 30 CAPSULE | Refills: 0 | Status: SHIPPED | OUTPATIENT
Start: 2023-01-05

## 2023-01-05 RX ORDER — MELOXICAM 15 MG/1
15 TABLET ORAL DAILY
Qty: 30 TABLET | Refills: 0 | OUTPATIENT
Start: 2023-01-05

## 2023-01-18 RX ORDER — OMEPRAZOLE 40 MG/1
CAPSULE, DELAYED RELEASE ORAL
Qty: 30 CAPSULE | Refills: 0 | OUTPATIENT
Start: 2023-01-18

## 2023-01-23 ENCOUNTER — TELEPHONE (OUTPATIENT)
Dept: INTERNAL MEDICINE CLINIC | Facility: CLINIC | Age: 58
End: 2023-01-23

## 2023-01-24 RX ORDER — ZOLPIDEM TARTRATE 5 MG/1
5 TABLET ORAL NIGHTLY
Qty: 30 TABLET | Refills: 0 | Status: SHIPPED | OUTPATIENT
Start: 2023-01-24

## 2023-01-24 RX ORDER — OMEPRAZOLE 40 MG/1
40 CAPSULE, DELAYED RELEASE ORAL DAILY
Qty: 30 CAPSULE | Refills: 0 | Status: SHIPPED | OUTPATIENT
Start: 2023-01-24

## 2023-01-24 NOTE — TELEPHONE ENCOUNTER
To MD:  The above refill request is for a controlled substance. Please review pended medication order. Print and sign for staff to fax to pharmacy or prescribe electronically. Last office visit: 8/11/22  Last time refill sent and quantity/refills: 11/21/22 #30 with 1   Per [place] [facility]  last dispensed 12/23/22    TO Dr. Siobhan Crespo to please advise-- per my chart encounter 1/5/23, patient will not be able to establish with new PCP (switching due to [facility] no longer accepting her insurance) until Feb 29.

## 2023-01-24 NOTE — TELEPHONE ENCOUNTER
Pt called  She also needs refill for Omeprazole 40 mg to cover until she establishes with her new PCP on Feb 28

## 2023-02-06 RX ORDER — IBUPROFEN 600 MG/1
600 TABLET ORAL 3 TIMES DAILY PRN
Qty: 30 TABLET | Refills: 0 | Status: CANCELLED | OUTPATIENT
Start: 2023-02-06

## 2023-02-06 NOTE — TELEPHONE ENCOUNTER
Patient called  Requesting refills for the following:  Tramadol  Meloxicam  Ibuprofen    Patient cannot get appt to establish with new PCP until 2/28/23.     Pt uses [address] [place] [address] as pharmacy    Tasked to [employer]

## 2023-02-06 NOTE — TELEPHONE ENCOUNTER
Sharif Ferrelles can you please review---    Pt should still have refills left for tramadol    Last physical 5/19/21 and last labs 10/21.  Had only \"acute\" visits in 2022  Pt claiming cant get appt with new pcp until 2/28

## 2023-02-10 NOTE — TELEPHONE ENCOUNTER
Patient is calling to check on the status of the refills  Patient doesn't need a refill for [facility]

## 2023-02-13 RX ORDER — MELOXICAM 15 MG/1
TABLET ORAL
Qty: 30 TABLET | Refills: 0 | OUTPATIENT
Start: 2023-02-13

## 2023-02-13 RX ORDER — IBUPROFEN 600 MG/1
TABLET ORAL
Qty: 30 TABLET | Refills: 0 | OUTPATIENT
Start: 2023-02-13

## 2023-02-22 RX ORDER — MELOXICAM 15 MG/1
TABLET ORAL
Qty: 90 TABLET | Refills: 0 | OUTPATIENT
Start: 2023-02-22

## 2023-03-27 RX ORDER — OMEPRAZOLE 40 MG/1
CAPSULE, DELAYED RELEASE ORAL
Qty: 30 CAPSULE | Refills: 0 | OUTPATIENT
Start: 2023-03-27

## 2023-03-30 RX ORDER — TRAMADOL HYDROCHLORIDE 50 MG/1
50 TABLET ORAL EVERY 12 HOURS PRN
Qty: 60 TABLET | Refills: 5 | OUTPATIENT
Start: 2023-03-30

## 2023-03-30 RX ORDER — IBUPROFEN 600 MG/1
600 TABLET ORAL 3 TIMES DAILY PRN
Qty: 30 TABLET | Refills: 0 | OUTPATIENT
Start: 2023-03-30

## 2023-03-30 RX ORDER — OMEPRAZOLE 40 MG/1
40 CAPSULE, DELAYED RELEASE ORAL DAILY
Qty: 30 CAPSULE | Refills: 0 | OUTPATIENT
Start: 2023-03-30

## 2023-03-30 RX ORDER — ZOLPIDEM TARTRATE 5 MG/1
5 TABLET ORAL NIGHTLY
Qty: 30 TABLET | Refills: 0 | OUTPATIENT
Start: 2023-03-30

## 2023-05-24 RX ORDER — IBUPROFEN 600 MG/1
TABLET ORAL
Qty: 30 TABLET | Refills: 0 | OUTPATIENT
Start: 2023-05-24

## (undated) DEVICE — KIT CLEAN ENDOKIT 1.1OZ GOWNX2

## (undated) DEVICE — STERILE LATEX POWDER-FREE SURGICAL GLOVESWITH NITRILE COATING: Brand: PROTEXIS

## (undated) DEVICE — KIT ENDO ORCAPOD 160/180/190

## (undated) DEVICE — 35 ML SYRINGE REGULAR TIP: Brand: MONOJECT

## (undated) DEVICE — LINE MNTR ADLT SET O2 INTMD

## (undated) DEVICE — MEDI-VAC NON-CONDUCTIVE SUCTION TUBING 6MM X 1.8M (6FT.) L: Brand: CARDINAL HEALTH

## (undated) DEVICE — CONMED SCOPE SAVER BITE BLOCK, 20X27 MM: Brand: SCOPE SAVER

## (undated) NOTE — LETTER
[facility]  Administration of Anesthesia  1. Elizabeth Kelley, or _________________________________ acting on her behalf, (Patient) (Dependent/Representative) request to receive anesthesia for my pending procedure/operation/treatment. A physician (anesthesiologist) alone or an anesthesiologist working with a nurse anesthetist may administer my anesthesia. 2. I understand that my anesthesiologist is not an employee or agent of the hospital, but is an independent medical practitioner who has been permitted to use its facilities for the care and treatment of his/her patients. 3. I acknowledge that a physician from [facility] or their designate(s), recommended anesthesia for me using her/his medical judgment. The type(s) of anesthesia I may receive include:                a) General Anesthesia, b) Spinal/Epidural Anesthesia, c) Regional Anesthesia or d) Monitored Anesthesia Care. 4. If my spinal, regional or monitored anesthesia care (local) is not satisfactory for my comfort, or if my medical condition requires, I consent to the administration of general anesthesia. 5. I am aware that the practice of anesthesiology is not an exact science and that some foreseeable risks or consequences may occur. Some common risks/consequences include sore throat and hoarseness, nausea and vomiting, muscle soreness, backache, damage to the mouth/teeth/vocal cords and eye injury. I understand that more rare but serious potential risks of anesthesia include blood pressure changes, drug reactions, cardiac arrest, brain damage, paralysis or death. These risks apply to whether I have general, spinal/epidural, regional or monitored anesthesia care. 6. OBSTETRIC PATIENTS: Specific risks/consequences of spinal/epidural anesthesia may include itching, low blood pressure, difficulty urinating, slowing of the baby's heart rate and headache.  Rare risks include infections, high spinal block, spinal bleeding, seizure, cardiac arrest and death. 7. AWARENESS: I understand that it is possible (but unlikely) to have explicit memory of events from the operating room while under general anesthesia. 8. ELECTROCONVULSIVE THERAPY PATIENTS: This consent serves for all treatments in a single course of therapy. 9. I understand that I must inform my anesthesiologist when I last ate and/or drank to minimize the risk of anesthesia. 10. If I am pregnant, or may pregnant, I understand that elective surgery should be postponed until after the baby is born. Anesthetics cross the placenta and may temporarily anesthetize the baby. Although fetal complications of anesthesia during pregnancy are rare, they may include birth defects, premature labor, brain damage and death. 11. I certify that I informed the anesthesiologist, to the best of my ability, about medication I take including blood thinners, anticoagulants, herbal remedies, narcotics and recreational drugs (e.g. cocaine, marijuana, PCP). Failure to inform my anesthesiologist about these medicines may increase my risk of anesthetic complications. The nature and purpose of my anesthetic management was explained to me. I had the opportunity to ask questions and the answers and information provided meet my satisfaction.   I retain the right to withdraw this consent at any time prior to the administration of said anesthetic.    ___________________________________________________           _____________________________________________________  Patient Signature                                                                                      Witness Signature                ___________________________________________________           _____________________________________________________  Date/Time                                                                                               Responsible person in case of minor/ unconscious pt /Relationship    My signature below affirms that prior to the time of the procedure, I have explained to the patient and/or his/her guardian, the risks and benefits of undergoing anesthesia, as well as any reasonable alternatives.     ___________________________________________________            _____________________________________________________  Physician Signature                            Date/Time  Patient Name: Bobby Wagner     : 1965     Printed: 2022      Medical Record #: [id number]                              Page 1 of     ----------ANESTHESIA CONSENT----------

## (undated) NOTE — Clinical Note
57/99/3463      To: Cole Hart    Re: Test Results      Thank you for your recent visit to our office. We have received your test results, which were done on   05/17/2017.      PAP Test:    Your results are normal.        HPV (Human papillomavirus) Patience

## (undated) NOTE — MR AVS SNAPSHOT
[address] at [place] [address]  [phone]               Thank you for choosing us for your health care visit with Vicky Jackson MD.  We are glad to serve you and happy to provide you wi Encounter for routine checking of intrauterine contraceptive device (IUD)    Women's annual routine gynecological examination    Routine cervical smear          Instructions and Information about Your Health     None      Allergies as of May 17, 2017 Tips for making healthy food choices  -   Enjoy your food, but eat less. Fully enjoy your food when eating. Don’t eat while distracted and slow down. Avoid over sized portions. Don’t eat while when you’re bored.      EAT THESE FOODS MORE OFTEN: EAT T

## (undated) NOTE — LETTER
[facility], [place]  Authorization for Invasive Procedures  1. I hereby authorize Dr. Dilshad Ren  , my physician and whomever may be designated as the doctor's assistant, to perform the following operation and/or procedure:  COLONOSCOPY/ESOPHAGOGASTRODUODENOSCOPY (EGD)   on Oral Sable at [facility].    2. My physician has explained to me the nature and purpose of the operation or other procedure, possible alternative methods of treatment, the risks involved and the possibility of complications to me. I understand the probable consequences of declining the recommended procedure and the alternative methods of treatment. I acknowledge that no guarantee has been made as to the result that may be obtained. 3. I recognize that during the course of this operation or other procedure, unforeseen conditions may necessitate additional or different procedures than those listed above. I, therefore, further authorize and request that the above-named physician, his/her physician assistants, or designees perform such procedures as are, in his/her professional opinion, necessary and desirable. If I have a Do Not Attempt Resuscitation (DNAR) order in place, that status will be suspended while in the operating room, procedural suite, and during the recovery period unless otherwise explicitly stated by me (or a person authorized to consent on my behalf). The surgeon or my attending physician will determine when the applicable recovery period ends for purposes of reinstating the DNAR order. 4. Should the need arise during my operation or immediate post-operative period; I also consent to the administration of blood and/or blood products.  Further, I understand that despite careful testing and screening of blood and blood products, I may still be subject to ill effects as a result of recieving a blood transfusion an/or blood producst. The following are some, but not all, of the potential risks that can occur: fever and allergic reactions, hemolytic reactions, transmission of disease such as hepatitis, AIDS, cytomegalovirus (CMV), and flluid overload. In the event that I wish to have autologous transfusions of my own blood, or a directed donor transfusion, I will discuss this with my physician. 5. I consent to the photographing of the operations or procedures to be performed for the purposes of advancing medicine, science, and/or education, provided my identity is not revealed. If the procedure has been videotaped, the physician/surgeon will obtain the original videotape. The [facility] will not be responsible for storage or maintenance of this tape. 6. I consent to the presence of a  or observer as deemed necessary by my physician or his designee. 7. Any tissues or organs removed in the operation or other procedure may be disposed of by and at the discretion of [facility].    8. I understand that the physician and his/her physician assistants may not be employees or agents of [facility], [facility], nor [facility], but are independent medical practitioners who have been permitted to use its facilities for the care and treatment of their patients. 9. Patients having a sterilization procedure: I understand that if the procedure is successful the results will be permanent and it will therefore be impossible for me to inseminate, conceive or bear children. I also understand that the procedure is intended to result in sterility, although the result has not been guaranteed. 10. I CERTIFY THAT I HAVE READ AND FULLY UNDERSTAND THE ABOVE CONSENT TO OPERATION and/or OTHER PROCEDURE. 11. I acknowledge that my physician has explained sedation/analgesia administration to me including the risks and benefits.  I consent to the administration of sedation/analgesia as may be necessary or desirable in the judgment of my physician. Signature of Patient:  ________________________________________________ Date: _________Time: _________    Responsible person in case of minor or unconscious: _____________________________Relationship: ____________     Witness Signature: ____________________________________________ Date: __________ Time: ___________    Statement of Physician  My signature below affirms that prior to the time of the procedure, I have explained to the patient and/or her legal representative, the risks and benefits involved in the proposed treatment and any reasonable alternative to the proposed treatment. I have also explained the risks and benefits involved in the refusal of the proposed treatment and have answered the patient's questions. If I have a significant financial interest in this procedure/surgery, I have disclosed this and had a discussion with my patient.     Signature of Physician:   ________________________________________Date: _________Time:_______ Patient Name: Tracy Armenta  : 1965   Printed: 2022    Medical Record #: [id number]

## (undated) NOTE — LETTER
Date & Time: 9/17/2018, 84:38 AM  Patient: Blaire St  Encounter Provider(s):    Mely Romano MD       To Whom It May Concern:    Blaire St was seen and treated in our department on 9/17/2018.  She should not return to work until 2-3 days fr